# Patient Record
Sex: MALE | Race: WHITE | Employment: FULL TIME | URBAN - METROPOLITAN AREA
[De-identification: names, ages, dates, MRNs, and addresses within clinical notes are randomized per-mention and may not be internally consistent; named-entity substitution may affect disease eponyms.]

---

## 2017-07-08 ENCOUNTER — MYC REFILL (OUTPATIENT)
Dept: FAMILY MEDICINE | Facility: CLINIC | Age: 42
End: 2017-07-08

## 2017-07-08 DIAGNOSIS — N52.9 ERECTILE DYSFUNCTION, UNSPECIFIED ERECTILE DYSFUNCTION TYPE: ICD-10-CM

## 2017-07-08 RX ORDER — SILDENAFIL 100 MG/1
100 TABLET, FILM COATED ORAL DAILY PRN
Qty: 20 TABLET | Refills: 11 | Status: CANCELLED | OUTPATIENT
Start: 2017-07-08

## 2017-07-10 ENCOUNTER — TELEPHONE (OUTPATIENT)
Dept: FAMILY MEDICINE | Facility: CLINIC | Age: 42
End: 2017-07-10

## 2017-07-10 DIAGNOSIS — N52.9 ERECTILE DYSFUNCTION, UNSPECIFIED ERECTILE DYSFUNCTION TYPE: ICD-10-CM

## 2017-07-10 RX ORDER — SILDENAFIL 100 MG/1
TABLET, FILM COATED ORAL
Qty: 20 TABLET | Refills: 11 | Status: SHIPPED | OUTPATIENT
Start: 2017-07-10 | End: 2017-07-11

## 2017-07-10 RX ORDER — SILDENAFIL CITRATE 20 MG/1
TABLET ORAL
Qty: 90 TABLET | Refills: 0 | Status: SHIPPED | OUTPATIENT
Start: 2017-07-10 | End: 2017-07-12

## 2017-07-10 NOTE — TELEPHONE ENCOUNTER
Message from [employer]:  Original authorizing provider: Krupa Alcantara MD    Alberto Fuentes would like a refill of the following medications:  sildenafil (VIAGRA) 100 MG cap/tab [Krupa Alcantara MD]    Preferred pharmacy: [facility] (HOME DELIVERY - [address]    Comment:

## 2017-07-10 NOTE — TELEPHONE ENCOUNTER
Spoke with patient on phone. Previous rx for viagra sent on 1/2/17 was sent to mail order pharmacy. Patient reports he has never used that mail order pharmacy and that he uses [facility] pharmacy. He would like refills resent to [facility] pharmacy.   Done. Brook Arora RN

## 2017-07-10 NOTE — TELEPHONE ENCOUNTER
We received a prescription for Viagra 100mg. Pt is requesting rx for Sildenafil 20mg.  This is generic and the price is way more affordable.  20 tablets of the 100mg is over $1200.    Thank you,  Neeru Jarquin, [facility]  [phone]

## 2017-07-11 ENCOUNTER — TELEPHONE (OUTPATIENT)
Dept: FAMILY MEDICINE | Facility: CLINIC | Age: 42
End: 2017-07-11

## 2017-07-11 ENCOUNTER — MYC MEDICAL ADVICE (OUTPATIENT)
Dept: FAMILY MEDICINE | Facility: CLINIC | Age: 42
End: 2017-07-11

## 2017-07-11 DIAGNOSIS — N52.9 ERECTILE DYSFUNCTION, UNSPECIFIED ERECTILE DYSFUNCTION TYPE: ICD-10-CM

## 2017-07-11 NOTE — TELEPHONE ENCOUNTER
Called pharmacy, patient prefers the 20 mg tabs as they are cheaper.  Called in the 20 mg dosing prescription to Sabina the pharmacist.   DIPTI ShethN RN

## 2017-07-11 NOTE — TELEPHONE ENCOUNTER
Both prescriptions have been sent. What else is needed?  It is unlikely to be covered by insurance. Cheapest is generally the 20 mg tablets and they take anywhere from 2-5 at a time.     Krupa Alcantara

## 2017-07-11 NOTE — TELEPHONE ENCOUNTER
Pharmacy calling, needing clarification for viagra. Both 20 mg and 100 mg capsules are in med list.    Patient specifically requesting 100 mg as he pays out of pocket.    Sending to PCP to clarify what patient is to be on. Then RN can call in clarification to pharmacy.    Marisol Bernal, DIPTIN RN

## 2017-07-12 RX ORDER — SILDENAFIL CITRATE 20 MG/1
TABLET ORAL
Qty: 90 TABLET | Refills: 3 | Status: SHIPPED | OUTPATIENT
Start: 2017-07-12 | End: 2018-04-11

## 2017-07-12 NOTE — TELEPHONE ENCOUNTER
RN cannot authorize refills as patient is due to be seen in September 2017.    Will Dr. Krupa Sands authorize refills?    Thank you, Marisol Bernal, DIPTIN RN

## 2017-10-13 DIAGNOSIS — N52.9 ERECTILE DYSFUNCTION, UNSPECIFIED ERECTILE DYSFUNCTION TYPE: ICD-10-CM

## 2017-10-13 NOTE — TELEPHONE ENCOUNTER
Drug:Sildenafil 20mg  Last Fill Date: 7/11/17  Last Fill Quantity: 90  Last Office Visit: 9/9/16    Neeru Jarquin [facility]  [phone]

## 2017-10-13 NOTE — TELEPHONE ENCOUNTER
Patient overdue to be seen.   Been greater than 1 year since last visit with PCP .    No appointment pending at this time.  Routing to provider to advise.    Minerva Joel RN

## 2017-10-17 RX ORDER — SILDENAFIL CITRATE 20 MG/1
TABLET ORAL
Qty: 90 TABLET | Refills: 0 | OUTPATIENT
Start: 2017-10-17

## 2017-10-17 NOTE — TELEPHONE ENCOUNTER
Refill for sildenafil declined due to need for appointment.  please notify patient or help schedule. Brook Arora RN

## 2017-10-18 DIAGNOSIS — N52.9 ERECTILE DYSFUNCTION, UNSPECIFIED ERECTILE DYSFUNCTION TYPE: ICD-10-CM

## 2017-10-18 RX ORDER — SILDENAFIL CITRATE 20 MG/1
TABLET ORAL
OUTPATIENT
Start: 2017-10-18

## 2017-10-18 NOTE — TELEPHONE ENCOUNTER
Krupa Sands MD     9:27 AM   Note      Pt needs to be seen once yearly for refills.     Krupa Sands        Refill of revatio refused.    Minerva Joel RN

## 2017-10-18 NOTE — TELEPHONE ENCOUNTER
I spoke to the patient and offered to make an appointment for him.  He prefers to call back and schedule an appointment.  Akila Dunlap,

## 2018-04-02 NOTE — PROGRESS NOTES
SUBJECTIVE:   CC: Alberto Fuentes is an 42 year old male who presents for preventative health visit.     Physical   Annual:     Getting at least 3 servings of Calcium per day::  Yes    Bi-annual eye exam::  Yes    Dental care twice a year::  Yes    Sleep apnea or symptoms of sleep apnea::  None    Diet::  Regular (no restrictions)    Frequency of exercise::  4-5 days/week    Duration of exercise::  30-45 minutes    Taking medications regularly::  Yes    Medication side effects::  Not applicable    Additional concerns today::  No            The 10-year ASCVD risk score (Skull Valleymarlene BRYANT Jr, et al., 2013) is: 5.5%    Values used to calculate the score:      Age: 42 years      Sex: Male      Is Non- : No      Diabetic: No      Tobacco smoker: Yes      Systolic Blood Pressure: 121 mmHg      Is BP treated: No      HDL Cholesterol: 44 mg/dL      Total Cholesterol: 202 mg/dL    No concerns, feels great  Eating healthy and exercising regularly      Today's PHQ-2 Score:   PHQ-2 ( 1999 Pf[employer]) 4/11/2018   Q1: Little interest or pleasure in doing things 0   Q2: Feeling down, depressed or hopeless 0   PHQ-2 Score 0   Q1: Little interest or pleasure in doing things Not at all   Q2: Feeling down, depressed or hopeless Not at all   PHQ-2 Score 0       Abuse: Current or Past(Physical, Sexual or Emotional)- No  Do you feel safe in your environment - Yes    Social History   Substance Use Topics     Smoking status: Current Every Day Smoker     Packs/day: 0.75     Years: 8.00     Types: Cigarettes     Smokeless tobacco: Never Used      Comment: quit 2010     Alcohol use No     Alcohol Use 4/11/2018   If you drink alcohol do you typically have greater than 3 drinks per day OR greater than 7 drinks per week? No   No flowsheet data found.    Last PSA: No results found for: PSA    Reviewed orders with patient. Reviewed health maintenance and updated orders accordingly - Yes  Labs reviewed in EPIC    Reviewed and updated as needed  "this visit by clinical staff  Tobacco  Allergies  Meds  Med Hx  Surg Hx  Fam Hx  Soc Hx        Reviewed and updated as needed this visit by Provider            Review of Systems  C: NEGATIVE for fever, chills, change in weight  I: NEGATIVE for worrisome rashes, moles or lesions  E: NEGATIVE for vision changes or irritation  ENT: NEGATIVE for ear, mouth and throat problems  R: NEGATIVE for significant cough or SOB  CV: NEGATIVE for chest pain, palpitations or peripheral edema  GI: NEGATIVE for nausea, abdominal pain, heartburn, or change in bowel habits   male: negative for dysuria, hematuria, decreased urinary stream, erectile dysfunction, urethral discharge  M: NEGATIVE for significant arthralgias or myalgia  N: NEGATIVE for weakness, dizziness or paresthesias  P: NEGATIVE for changes in mood or affect    OBJECTIVE:   /76  Pulse 76  Temp 97.7  F (36.5  C) (Oral)  Resp 17  Ht 5' 8\" (1.727 m)  Wt 224 lb (101.6 kg)  SpO2 97%  BMI 34.06 kg/m2    Physical Exam  GENERAL: healthy, alert and no distress  EYES: Eyes grossly normal to inspection, PERRL and conjunctivae and sclerae normal  HENT: ear canals and TM's normal, nose and mouth without ulcers or lesions  NECK: no adenopathy, no asymmetry, masses, or scars and thyroid normal to palpation  RESP: lungs clear to auscultation - no rales, rhonchi or wheezes  CV: regular rate and rhythm, normal S1 S2, no S3 or S4, no murmur, click or rub, no peripheral edema and peripheral pulses strong  ABDOMEN: soft, nontender, no hepatosplenomegaly, no masses and bowel sounds normal  MS: no gross musculoskeletal defects noted, no edema  SKIN: no suspicious lesions or rashes  NEURO: Normal strength and tone, mentation intact and speech normal  PSYCH: mentation appears normal, affect normal/bright    ASSESSMENT/PLAN:   1. Routine general medical examination at a health care facility      2. Erectile dysfunction, unspecified erectile dysfunction type  Refill as " "needed  - sildenafil (REVATIO) 20 MG tablet; Take 1 tablet (20 mg)to 5 tablets by mouth as needed  30 minutes to4 hours before intercourse Never use with nitroglycerin, terazosin or doxazosin.  Dispense: 90 tablet; Refill: 3    COUNSELING:   Reviewed preventive health counseling, as reflected in patient instructions       Regular exercise       Healthy diet/nutrition       Vision screening         reports that he has been smoking Cigarettes.  He has a 6.00 pack-year smoking history. He has never used smokeless tobacco.    Estimated body mass index is 34.06 kg/(m^2) as calculated from the following:    Height as of this encounter: 5' 8\" (1.727 m).    Weight as of this encounter: 224 lb (101.6 kg).   Weight management plan: Discussed healthy diet and exercise guidelines and patient will follow up in 12 months in clinic to re-evaluate.    Counseling Resources:  ATP IV Guidelines  Pooled Cohorts Equation Calculator  FRAX Risk Assessment  ICSI Preventive Guidelines  Dietary Guidelines for Americans, 2010  [employer] MyPlate  ASA Prophylaxis  Lung CA Screening    Krupa Alcantara MD  [facility]  Answers for HPI/ROS submitted by the patient on 4/11/2018   PHQ-2 Score: 0    "

## 2018-04-04 ENCOUNTER — DOCUMENTATION ONLY (OUTPATIENT)
Dept: LAB | Facility: CLINIC | Age: 43
End: 2018-04-04

## 2018-04-04 DIAGNOSIS — Z13.1 SCREENING FOR DIABETES MELLITUS: ICD-10-CM

## 2018-04-04 DIAGNOSIS — Z13.6 CARDIOVASCULAR SCREENING; LDL GOAL LESS THAN 160: Primary | ICD-10-CM

## 2018-04-04 NOTE — PROGRESS NOTES
This patient has a lab only appointment on 4/7/2018 but does not have future orders. Please review, associate diagnosis and sign pending lab orders for the upcoming appointment.  He has an appointment with Dr. Sands on 4/11/2018.    Thank you,    [facility]

## 2018-04-05 DIAGNOSIS — Z13.6 CARDIOVASCULAR SCREENING; LDL GOAL LESS THAN 160: ICD-10-CM

## 2018-04-05 DIAGNOSIS — Z13.1 SCREENING FOR DIABETES MELLITUS: ICD-10-CM

## 2018-04-05 LAB
CHOLEST SERPL-MCNC: 202 MG/DL
GLUCOSE SERPL-MCNC: 104 MG/DL (ref 70–99)
HDLC SERPL-MCNC: 44 MG/DL
LDLC SERPL CALC-MCNC: 133 MG/DL
NONHDLC SERPL-MCNC: 158 MG/DL
TRIGL SERPL-MCNC: 124 MG/DL

## 2018-04-05 PROCEDURE — 80061 LIPID PANEL: CPT | Performed by: FAMILY MEDICINE

## 2018-04-05 PROCEDURE — 36415 COLL VENOUS BLD VENIPUNCTURE: CPT | Performed by: FAMILY MEDICINE

## 2018-04-05 PROCEDURE — 82947 ASSAY GLUCOSE BLOOD QUANT: CPT | Performed by: FAMILY MEDICINE

## 2018-04-11 ENCOUNTER — OFFICE VISIT (OUTPATIENT)
Dept: FAMILY MEDICINE | Facility: CLINIC | Age: 43
End: 2018-04-11
Payer: COMMERCIAL

## 2018-04-11 VITALS
WEIGHT: 224 LBS | HEIGHT: 68 IN | BODY MASS INDEX: 33.95 KG/M2 | OXYGEN SATURATION: 97 % | HEART RATE: 76 BPM | RESPIRATION RATE: 17 BRPM | TEMPERATURE: 97.7 F | SYSTOLIC BLOOD PRESSURE: 121 MMHG | DIASTOLIC BLOOD PRESSURE: 76 MMHG

## 2018-04-11 DIAGNOSIS — N52.9 ERECTILE DYSFUNCTION, UNSPECIFIED ERECTILE DYSFUNCTION TYPE: ICD-10-CM

## 2018-04-11 DIAGNOSIS — Z00.00 ROUTINE GENERAL MEDICAL EXAMINATION AT A HEALTH CARE FACILITY: Primary | ICD-10-CM

## 2018-04-11 PROCEDURE — 99396 PREV VISIT EST AGE 40-64: CPT | Performed by: FAMILY MEDICINE

## 2018-04-11 RX ORDER — SILDENAFIL CITRATE 20 MG/1
TABLET ORAL
Qty: 90 TABLET | Refills: 3 | Status: SHIPPED | OUTPATIENT
Start: 2018-04-11 | End: 2018-04-11

## 2018-04-11 RX ORDER — SILDENAFIL CITRATE 20 MG/1
TABLET ORAL
Qty: 90 TABLET | Refills: 3 | Status: SHIPPED | OUTPATIENT
Start: 2018-04-11 | End: 2019-06-09

## 2018-04-11 NOTE — MR AVS SNAPSHOT
After Visit Summary   4/11/2018    Alberto Fuentes    MRN: [id number]           Patient Information     Date Of Birth          1975        Visit Information        Provider Department      4/11/2018 5:40 PM Krupa Sands MD [facility]        Today's Diagnoses     Routine general medical examination at a health care facility    -  1    Erectile dysfunction, unspecified erectile dysfunction type          Care Instructions      Preventive Health Recommendations  Male Ages 40 to 49    Yearly exam:             See your health care provider every year in order to  o   Review health changes.   o   Discuss preventive care.    o   Review your medicines if your doctor has prescribed any.    You should be tested each year for STDs (sexually transmitted diseases) if you re at risk.     Have a cholesterol test every 5 years.     Have a colonoscopy (test for colon cancer) if someone in your family has had colon cancer or polyps before age 50.     After age 45, have a diabetes test (fasting glucose). If you are at risk for diabetes, you should have this test every 3 years.      Talk with your health care provider about whether or not a prostate cancer screening test (PSA) is right for you.    Shots: Get a flu shot each year. Get a tetanus shot every 10 years.     Nutrition:    Eat at least 5 servings of fruits and vegetables daily.     Eat whole-grain bread, whole-wheat pasta and brown rice instead of white grains and rice.     Talk to your provider about Calcium and Vitamin D.     Lifestyle    Exercise for at least 150 minutes a week (30 minutes a day, 5 days a week). This will help you control your weight and prevent disease.     Limit alcohol to one drink per day.     No smoking.     Wear sunscreen to prevent skin cancer.     See your dentist every six months for an exam and cleaning.              Follow-ups after your visit        Who to contact     If you have questions or need follow up  "information about today's clinic visit or your schedule please contact [facility] directly at [phone].  Normal or non-critical lab and imaging results will be communicated to you by MyChart, letter or phone within 4 business days after the clinic has received the results. If you do not hear from us within 7 days, please contact the clinic through Samb[employer]on[employer] or phone. If you have a critical or abnormal lab result, we will notify you by phone as soon as possible.  Submit refill requests through [employer] or call your pharmacy and they will forward the refill request to us. Please allow 3 business days for your refill to be completed.          Additional Information About Your Visit        Samb[employer]on[employer] Information     [employer] gives you secure access to your electronic health record. If you see a primary care provider, you can also send messages to your care team and make appointments. If you have questions, please call your primary care clinic.  If you do not have a primary care provider, please call [phone] and they will assist you.        Care EveryWhere ID     This is your Care EveryWhere ID. This could be used by other organizations to access your [place] medical records  [id number]        Your Vitals Were     Pulse Temperature Respirations Height Pulse Oximetry BMI (Body Mass Index)    76 97.7  F (36.5  C) (Oral) 17 5' 8\" (1.727 m) 97% 34.06 kg/m2       Blood Pressure from Last 3 Encounters:   04/11/18 121/76   09/09/16 102/67   08/23/16 120/70    Weight from Last 3 Encounters:   04/11/18 224 lb (101.6 kg)   09/09/16 228 lb (103.4 kg)   08/23/16 236 lb (107 kg)              Today, you had the following     No orders found for display         Today's Medication Changes          These changes are accurate as of 4/11/18 11:59 PM.  If you have any questions, ask your nurse or doctor.               Start taking these medicines.        Dose/Directions    sildenafil 20 MG tablet   Commonly known as:  " REVATIO   Used for:  Erectile dysfunction, unspecified erectile dysfunction type   Started by:  Krupa Sands MD        Take 1 tablet (20 mg)to 5 tablets by mouth as needed  30 minutes to4 hours before intercourse Never use with nitroglycerin, terazosin or doxazosin.   Quantity:  90 tablet   Refills:  3            Where to get your medicines      Some of these will need a paper prescription and others can be bought over the counter.  Ask your nurse if you have questions.     Bring a paper prescription for each of these medications     sildenafil 20 MG tablet                Primary Care Provider Office Phone # Fax #    Krupa Sands [phone] [address] [facility] [address]        Equal Access to Services     MOISES JAMES : Hadii luis bright hadasho Solibby, waaxda luqadaha, qaybta kaalmada adeegyada, fatmata díaz. So [facility] [phone].    ATENCIÓN: Si habla español, tiene a chavez disposición servicios gratuitos de asistencia lingüística. Llame al [phone].    We comply with applicable federal civil rights laws and [place] laws. We do not discriminate on the basis of race, color, national origin, age, disability, sex, sexual orientation, or gender identity.            Thank you!     Thank you for choosing [facility]  for your care. Our goal is always to provide you with excellent care. Hearing back from our patients is one way we can continue to improve our services. Please take a few minutes to complete the written survey that you may receive in the mail after your visit with us. Thank you!             Your Updated Medication List - Protect others around you: Learn how to safely use, store and throw away your medicines at www.disposemymeds.org.          This list is accurate as of 4/11/18 11:59 PM.  Always use your most recent med list.                   Brand Name Dispense Instructions for use Diagnosis    ADVIL PO           sildenafil 20  MG tablet    REVATIO    90 tablet    Take 1 tablet (20 mg)to 5 tablets by mouth as needed  30 minutes to4 hours before intercourse Never use with nitroglycerin, terazosin or doxazosin.    Erectile dysfunction, unspecified erectile dysfunction type

## 2019-01-31 NOTE — TELEPHONE ENCOUNTER
Assessment/Plan:     Diagnoses and all orders for this visit:    Type 2 diabetes mellitus without complication, without long-term current use of insulin (HCC)  -     Hemoglobin A1C; Future  -     Microalbumin / creatinine urine ratio  -     Hemoglobin A1C    Neuropathic pain  -     gabapentin (NEURONTIN) 400 mg capsule; Take 1 capsule (400 mg total) by mouth 3 (three) times a day    HTN (hypertension), benign    Screening for prostate cancer  -     PSA Total (Reflex To Free); Future  -     PSA Total (Reflex To Free)    Hyperlipidemia, unspecified hyperlipidemia type  -     Comprehensive metabolic panel; Future  -     Lipid panel; Future  -     Comprehensive metabolic panel  -     Lipid panel    Screening for colon cancer  -     Occult Blood, Fecal Immunochemical; Future      patient comes back as new patient  We ordered lab work on him  He follows pain management will continue to do so  He will continue his current medications  I ordered a stool screening test for colon cancer as he has not had a colonoscopy and is unwilling to do so  He can follow up in 4 months or sooner if need      Subjective:     Chief Complaint   Patient presents with    [facility] Care     new patient        Patient ID: Fraser Gottron is a 76 y o  male  Patient is here for new patient visit  He has no acute complaints  He had recent blood work done last September that showed sugars were well controlled  He has a history of neuropathy  No other acute complaints today        The following portions of the patient's history were reviewed and updated as appropriate: allergies, current medications, past family history, past medical history, past social history, past surgical history and problem list     Review of Systems   Constitutional: Negative  HENT: Negative  Eyes: Negative  Respiratory: Negative  Cardiovascular: Negative  Gastrointestinal: Negative  Endocrine: Negative  Genitourinary: Negative  Patient calling, asked to speak with Marisol, declined to leave a reason other than it is related to his [employer] message.   Musculoskeletal: Negative  Skin: Negative  Allergic/Immunologic: Negative  Neurological: Negative  Hematological: Negative  Psychiatric/Behavioral: Negative  All other systems reviewed and are negative  Objective:    Vitals:    01/31/19 0946   BP: 110/70   BP Location: Right arm   Patient Position: Sitting   Cuff Size: Large   Pulse: 71   Resp: 15   Temp: 98 2 °F (36 8 °C)   TempSrc: Tympanic   SpO2: 96%   Weight: 124 kg (274 lb)   Height: 5' 7" (1 702 m)          Physical Exam   Constitutional: He is oriented to person, place, and time  He appears well-developed and well-nourished  No distress  HENT:   Head: Normocephalic  Right Ear: External ear normal    Left Ear: External ear normal    Nose: Nose normal    Mouth/Throat: Oropharynx is clear and moist    Eyes: Pupils are equal, round, and reactive to light  Conjunctivae and EOM are normal  Right eye exhibits no discharge  Left eye exhibits no discharge  Neck: Normal range of motion  Cardiovascular: Normal rate, regular rhythm and normal heart sounds  Pulmonary/Chest: Effort normal and breath sounds normal    Abdominal: Soft  Bowel sounds are normal  He exhibits no distension  There is no tenderness  Musculoskeletal: Normal range of motion  Neurological: He is alert and oriented to person, place, and time  No cranial nerve deficit  Skin: Skin is warm and dry  No rash noted  Psychiatric: He has a normal mood and affect  His behavior is normal  Judgment and thought content normal    Nursing note and vitals reviewed  Patient's shoes and socks were not removed     patient declined foot exam today and refused to take off socks and shoes

## 2019-04-22 ENCOUNTER — TRANSFERRED RECORDS (OUTPATIENT)
Dept: HEALTH INFORMATION MANAGEMENT | Facility: CLINIC | Age: 44
End: 2019-04-22

## 2019-06-09 DIAGNOSIS — N52.9 ERECTILE DYSFUNCTION, UNSPECIFIED ERECTILE DYSFUNCTION TYPE: ICD-10-CM

## 2019-06-09 NOTE — LETTER
June 13, 2019    Alberto Fuentes  [address] [place] [address]    Dear Alberto,       We recently received a refill request for Sildenafil.  We have refilled this for a one time 30 day supply only because you are due for a:    ED office visit      Please call at your earliest convenience so that there will not be a delay with your future refills.          Thank you,   Your [facility] Team/jillian  [phone]

## 2019-06-10 RX ORDER — SILDENAFIL CITRATE 20 MG/1
TABLET ORAL
Qty: 30 TABLET | Refills: 0 | Status: SHIPPED | OUTPATIENT
Start: 2019-06-10 | End: 2020-03-23

## 2019-06-10 NOTE — TELEPHONE ENCOUNTER
Medication is being filled for 1 time refill only due to:  Patient needs to be seen because it has been more than one year since last visit. ED.  Keyla RESENDEZN, RN

## 2020-03-01 ENCOUNTER — HEALTH MAINTENANCE LETTER (OUTPATIENT)
Age: 45
End: 2020-03-01

## 2020-03-23 ENCOUNTER — MYC REFILL (OUTPATIENT)
Dept: FAMILY MEDICINE | Facility: CLINIC | Age: 45
End: 2020-03-23

## 2020-03-23 ENCOUNTER — MYC MEDICAL ADVICE (OUTPATIENT)
Dept: FAMILY MEDICINE | Facility: CLINIC | Age: 45
End: 2020-03-23

## 2020-03-23 DIAGNOSIS — N52.9 ERECTILE DYSFUNCTION, UNSPECIFIED ERECTILE DYSFUNCTION TYPE: ICD-10-CM

## 2020-03-23 NOTE — TELEPHONE ENCOUNTER
Patient is due for an office visit.  Please advise, would you like patient to do a Virtual Visit for follow up?  If so, which one?   Angelita Morris RN

## 2020-03-24 ENCOUNTER — E-VISIT (OUTPATIENT)
Dept: FAMILY MEDICINE | Facility: CLINIC | Age: 45
End: 2020-03-24
Payer: COMMERCIAL

## 2020-03-24 DIAGNOSIS — N52.9 ERECTILE DYSFUNCTION, UNSPECIFIED ERECTILE DYSFUNCTION TYPE: Primary | ICD-10-CM

## 2020-03-24 PROCEDURE — 99207 ZZC NON-BILLABLE SERV PER CHARTING: CPT | Performed by: FAMILY MEDICINE

## 2020-03-24 RX ORDER — SILDENAFIL CITRATE 20 MG/1
TABLET ORAL
Qty: 30 TABLET | Refills: 2 | Status: SHIPPED | OUTPATIENT
Start: 2020-03-24 | End: 2020-07-27

## 2020-03-24 NOTE — TELEPHONE ENCOUNTER
Patient has an up coming lab appointment on 3.28.2020. Please review and place future orders that may be needed.     Thank you  Andria Dunne MLT ([facility])

## 2020-03-27 ENCOUNTER — DOCUMENTATION ONLY (OUTPATIENT)
Dept: LAB | Facility: CLINIC | Age: 45
End: 2020-03-27

## 2020-03-27 NOTE — PROGRESS NOTES
I spoke to the patient and I cancelled his pvl appt because his physical was cancelled.  No need for pvl's.  Akila Dunlap,

## 2020-07-25 DIAGNOSIS — N52.9 ERECTILE DYSFUNCTION, UNSPECIFIED ERECTILE DYSFUNCTION TYPE: ICD-10-CM

## 2020-07-27 RX ORDER — SILDENAFIL CITRATE 20 MG/1
TABLET ORAL
Qty: 30 TABLET | Refills: 2 | Status: SHIPPED | OUTPATIENT
Start: 2020-07-27 | End: 2020-11-27

## 2020-07-27 NOTE — TELEPHONE ENCOUNTER
Prescription approved per [facility] Refill Protocol.  Angelita Morris RN

## 2020-07-28 ENCOUNTER — MYC REFILL (OUTPATIENT)
Dept: FAMILY MEDICINE | Facility: CLINIC | Age: 45
End: 2020-07-28

## 2020-07-28 DIAGNOSIS — N52.9 ERECTILE DYSFUNCTION, UNSPECIFIED ERECTILE DYSFUNCTION TYPE: ICD-10-CM

## 2020-07-28 RX ORDER — SILDENAFIL CITRATE 20 MG/1
TABLET ORAL
Qty: 30 TABLET | Refills: 2 | Status: CANCELLED | OUTPATIENT
Start: 2020-07-28

## 2020-11-27 ENCOUNTER — MYC REFILL (OUTPATIENT)
Dept: FAMILY MEDICINE | Facility: CLINIC | Age: 45
End: 2020-11-27

## 2020-11-27 DIAGNOSIS — N52.9 ERECTILE DYSFUNCTION, UNSPECIFIED ERECTILE DYSFUNCTION TYPE: ICD-10-CM

## 2020-11-30 RX ORDER — SILDENAFIL CITRATE 20 MG/1
TABLET ORAL
Qty: 30 TABLET | Refills: 2 | Status: SHIPPED | OUTPATIENT
Start: 2020-11-30 | End: 2021-11-26

## 2020-12-13 ENCOUNTER — HEALTH MAINTENANCE LETTER (OUTPATIENT)
Age: 45
End: 2020-12-13

## 2021-04-17 ENCOUNTER — HEALTH MAINTENANCE LETTER (OUTPATIENT)
Age: 46
End: 2021-04-17

## 2021-09-26 ENCOUNTER — HEALTH MAINTENANCE LETTER (OUTPATIENT)
Age: 46
End: 2021-09-26

## 2021-11-01 NOTE — PROGRESS NOTES
SUBJECTIVE:   CC: Alberto Fuentes is an 46 year old male who presents for preventative health visit.       Patient has been advised of split billing requirements and indicates understanding: Yes  Healthy Habits:     Getting at least 3 servings of Calcium per day:  Yes    Bi-annual eye exam:  Yes    Dental care twice a year:  Yes    Sleep apnea or symptoms of sleep apnea:  None    Diet:  Regular (no restrictions)    Frequency of exercise:  2-3 days/week    Duration of exercise:  30-45 minutes    Taking medications regularly:  Yes    Medication side effects:  None    PHQ-2 Total Score: 0    Additional concerns today:  No      Left shoulder pain for 18 months  Does not recall any injury to bring this on recently but did injure it in his 20s when he was weight lifting  Has seen physical therapist from work   Has done exercise off and on for this past year and a half  It flares up and down but has not gone away        Today's PHQ-2 Score:   PHQ-2 ( 1999 [employer]) 4/11/2018   Q1: Little interest or pleasure in doing things 0   Q2: Feeling down, depressed or hopeless 0   PHQ-2 Score 0   Q1: Little interest or pleasure in doing things Not at all   Q2: Feeling down, depressed or hopeless Not at all   PHQ-2 Score 0       Abuse: Current or Past(Physical, Sexual or Emotional)- No  Do you feel safe in your environment? Yes    Have you ever done Advance Care Planning? (For example, a Health Directive, POLST, or a discussion with a medical provider or your loved ones about your wishes): Yes, patient states has an Advance Care Planning document and will bring a copy to the clinic.    Social History     Tobacco Use     Smoking status: Current Every Day Smoker     Packs/day: 0.75     Years: 8.00     Pack years: 6.00     Types: Cigarettes     Smokeless tobacco: Never Used     Tobacco comment: quit 2010   Substance Use Topics     Alcohol use: No     If you drink alcohol do you typically have >3 drinks per day or >7 drinks per week?  No    Alcohol Use 4/11/2018   Prescreen: >3 drinks/day or >7 drinks/week? No   Prescreen: >3 drinks/day or >7 drinks/week? -       Last PSA: No results found for: PSA    Reviewed orders with patient. Reviewed health maintenance and updated orders accordingly - Yes  Lab work is in process    Reviewed and updated as needed this visit by clinical staff                 Reviewed and updated as needed this visit by Provider                    Review of Systems   Constitutional: Negative for chills and fever.   HENT: Negative for congestion, ear pain, hearing loss and sore throat.    Eyes: Negative for pain and visual disturbance.   Respiratory: Negative for cough and shortness of breath.    Cardiovascular: Negative for chest pain, palpitations and peripheral edema.   Gastrointestinal: Negative for abdominal pain, constipation, diarrhea, heartburn, hematochezia and nausea.   Genitourinary: Positive for impotence. Negative for discharge, dysuria, frequency, genital sores, hematuria and urgency.   Musculoskeletal: Positive for arthralgias. Negative for myalgias.   Skin: Negative for rash.   Neurological: Negative for dizziness, weakness, headaches and paresthesias.   Psychiatric/Behavioral: Negative for mood changes. The patient is not nervous/anxious.      CONSTITUTIONAL: NEGATIVE for fever, chills, change in weight  INTEGUMENTARY/SKIN: NEGATIVE for worrisome rashes, moles or lesions  EYES: NEGATIVE for vision changes or irritation  ENT: NEGATIVE for ear, mouth and throat problems  RESP: NEGATIVE for significant cough or SOB  CV: NEGATIVE for chest pain, palpitations or peripheral edema  GI: NEGATIVE for nausea, abdominal pain, heartburn, or change in bowel habits   male: negative for dysuria, hematuria, decreased urinary stream, erectile dysfunction, urethral discharge  MUSCULOSKELETAL: NEGATIVE for significant arthralgias or myalgia  NEURO: NEGATIVE for weakness, dizziness or paresthesias  PSYCHIATRIC: NEGATIVE for  changes in mood or affect    OBJECTIVE:   There were no vitals taken for this visit.    Physical Exam  GENERAL: healthy, alert and no distress  EYES: Eyes grossly normal to inspection, PERRL and conjunctivae and sclerae normal  HENT: ear canals and TM's normal, nose and mouth without ulcers or lesions  NECK: no adenopathy, no asymmetry, masses, or scars and thyroid normal to palpation  RESP: lungs clear to auscultation - no rales, rhonchi or wheezes  CV: regular rate and rhythm, normal S1 S2, no S3 or S4, no murmur, click or rub, no peripheral edema and peripheral pulses strong  ABDOMEN: soft, nontender, no hepatosplenomegaly, no masses and bowel sounds normal  MS: no gross musculoskeletal defects noted, no edema, + empty pop can and lift off test for left shoulder  SKIN: no suspicious lesions or rashes  NEURO: Normal strength and tone, mentation intact and speech normal  PSYCH: mentation appears normal, affect normal/bright    Diagnostic Test Results:  Labs reviewed in Epic    ASSESSMENT/PLAN:   (Z00.00) Routine general medical examination at a health care facility  (primary encounter diagnosis)  Comment:   Plan:     (M25.512,  G89.29) Chronic left shoulder pain  Comment: needs MRI since failed therapy  Plan: MR Shoulder Left w/o Contrast            (E66.01) Morbid obesity (H)  Comment: encourage regular exercise and healthy diet  Plan:     (Z23) Need for diphtheria-tetanus-pertussis (Tdap) vaccine  Comment: given  Plan: TDAP VACCINE (Adacel, Boostrix)  [[id number]]            (Z13.1) Screening for diabetes mellitus  Comment:   Plan: **Basic metabolic panel FUTURE 2mo            (Z13.220) Screening cholesterol level  Comment:   Plan: Lipid panel reflex to direct LDL Fasting              Patient has been advised of split billing requirements and indicates understanding: Yes  COUNSELING:   Reviewed preventive health counseling, as reflected in patient instructions       Regular exercise       Healthy diet/nutrition       " Vision screening    Estimated body mass index is 34.06 kg/m  as calculated from the following:    Height as of 4/11/18: 1.727 m (5' 8\").    Weight as of 4/11/18: 101.6 kg (224 lb).     Weight management plan: Discussed healthy diet and exercise guidelines    He reports that he has been smoking cigarettes. He has a 6.00 pack-year smoking history. He has never used smokeless tobacco.  Tobacco Cessation Action Plan:   Information offered: Patient not interested at this time      Counseling Resources:  ATP IV Guidelines  Pooled Cohorts Equation Calculator  FRAX Risk Assessment  ICSI Preventive Guidelines  Dietary Guidelines for Americans, 2010  USDA's MyPlate  ASA Prophylaxis  Lung CA Screening    Krupa Zayas MD  [facility]    "

## 2021-11-02 ENCOUNTER — OFFICE VISIT (OUTPATIENT)
Dept: FAMILY MEDICINE | Facility: CLINIC | Age: 46
End: 2021-11-02
Payer: COMMERCIAL

## 2021-11-02 VITALS
SYSTOLIC BLOOD PRESSURE: 128 MMHG | WEIGHT: 256 LBS | RESPIRATION RATE: 16 BRPM | DIASTOLIC BLOOD PRESSURE: 80 MMHG | TEMPERATURE: 98.4 F | BODY MASS INDEX: 38.8 KG/M2 | OXYGEN SATURATION: 98 % | HEIGHT: 68 IN | HEART RATE: 78 BPM

## 2021-11-02 DIAGNOSIS — Z13.1 SCREENING FOR DIABETES MELLITUS: ICD-10-CM

## 2021-11-02 DIAGNOSIS — Z13.220 SCREENING CHOLESTEROL LEVEL: ICD-10-CM

## 2021-11-02 DIAGNOSIS — Z23 NEED FOR DIPHTHERIA-TETANUS-PERTUSSIS (TDAP) VACCINE: ICD-10-CM

## 2021-11-02 DIAGNOSIS — E66.01 MORBID OBESITY (H): ICD-10-CM

## 2021-11-02 DIAGNOSIS — Z00.00 ROUTINE GENERAL MEDICAL EXAMINATION AT A HEALTH CARE FACILITY: Primary | ICD-10-CM

## 2021-11-02 DIAGNOSIS — M25.512 CHRONIC LEFT SHOULDER PAIN: ICD-10-CM

## 2021-11-02 DIAGNOSIS — G89.29 CHRONIC LEFT SHOULDER PAIN: ICD-10-CM

## 2021-11-02 LAB
ANION GAP SERPL CALCULATED.3IONS-SCNC: 3 MMOL/L (ref 3–14)
BUN SERPL-MCNC: 14 MG/DL (ref 7–30)
CALCIUM SERPL-MCNC: 9 MG/DL (ref 8.5–10.1)
CHLORIDE BLD-SCNC: 104 MMOL/L (ref 94–109)
CHOLEST SERPL-MCNC: 253 MG/DL
CO2 SERPL-SCNC: 29 MMOL/L (ref 20–32)
CREAT SERPL-MCNC: 0.95 MG/DL (ref 0.66–1.25)
FASTING STATUS PATIENT QL REPORTED: YES
GFR SERPL CREATININE-BSD FRML MDRD: >90 ML/MIN/1.73M2
GLUCOSE BLD-MCNC: 111 MG/DL (ref 70–99)
HDLC SERPL-MCNC: 37 MG/DL
LDLC SERPL CALC-MCNC: 174 MG/DL
NONHDLC SERPL-MCNC: 216 MG/DL
POTASSIUM BLD-SCNC: 4 MMOL/L (ref 3.4–5.3)
SODIUM SERPL-SCNC: 136 MMOL/L (ref 133–144)
TRIGL SERPL-MCNC: 208 MG/DL

## 2021-11-02 PROCEDURE — 80061 LIPID PANEL: CPT | Performed by: FAMILY MEDICINE

## 2021-11-02 PROCEDURE — 90471 IMMUNIZATION ADMIN: CPT | Performed by: FAMILY MEDICINE

## 2021-11-02 PROCEDURE — 80048 BASIC METABOLIC PNL TOTAL CA: CPT | Performed by: FAMILY MEDICINE

## 2021-11-02 PROCEDURE — 99213 OFFICE O/P EST LOW 20 MIN: CPT | Mod: 25 | Performed by: FAMILY MEDICINE

## 2021-11-02 PROCEDURE — 36415 COLL VENOUS BLD VENIPUNCTURE: CPT | Performed by: FAMILY MEDICINE

## 2021-11-02 PROCEDURE — 99386 PREV VISIT NEW AGE 40-64: CPT | Mod: 25 | Performed by: FAMILY MEDICINE

## 2021-11-02 PROCEDURE — 90715 TDAP VACCINE 7 YRS/> IM: CPT | Performed by: FAMILY MEDICINE

## 2021-11-02 ASSESSMENT — ENCOUNTER SYMPTOMS
SHORTNESS OF BREATH: 0
HEMATURIA: 0
DIZZINESS: 0
PALPITATIONS: 0
DYSURIA: 0
WEAKNESS: 0
ARTHRALGIAS: 1
DIARRHEA: 0
FREQUENCY: 0
EYE PAIN: 0
CHILLS: 0
ABDOMINAL PAIN: 0
HEMATOCHEZIA: 0
CONSTIPATION: 0
HEARTBURN: 0
NERVOUS/ANXIOUS: 0
MYALGIAS: 0
SORE THROAT: 0
COUGH: 0
NAUSEA: 0
HEADACHES: 0
PARESTHESIAS: 0
FEVER: 0

## 2021-11-02 ASSESSMENT — MIFFLIN-ST. JEOR: SCORE: 2005.71

## 2021-11-02 ASSESSMENT — PAIN SCALES - GENERAL: PAINLEVEL: EXTREME PAIN (9)

## 2021-11-02 NOTE — NURSING NOTE
Prior to immunization administration, verified patients identity using patient s name and date of birth. Please see Immunization Activity for additional information.     Screening Questionnaire for Adult Immunization    Are you sick today?   No   Do you have allergies to medications, food, a vaccine component or latex?   No   Have you ever had a serious reaction after receiving a vaccination?   No   Do you have a long-term health problem with heart, lung, kidney, or metabolic disease (e.g., diabetes), asthma, a blood disorder, no spleen, complement component deficiency, a cochlear implant, or a spinal fluid leak?  Are you on long-term aspirin therapy?   No   Do you have cancer, leukemia, HIV/AIDS, or any other immune system problem?   No   Do you have a parent, brother, or sister with an immune system problem?   No   In the past 3 months, have you taken medications that affect  your immune system, such as prednisone, other steroids, or anticancer drugs; drugs for the treatment of rheumatoid arthritis, Crohn s disease, or psoriasis; or have you had radiation treatments?   No   Have you had a seizure, or a brain or other nervous system problem?   No   During the past year, have you received a transfusion of blood or blood    products, or been given immune (gamma) globulin or antiviral drug?   No   For women: Are you pregnant or is there a chance you could become       pregnant during the next month?   No   Have you received any vaccinations in the past 4 weeks?   No     Immunization questionnaire answers were all negative.        Per orders of Dr. Alcantara, injection of tdap given by Lanie Westbrook MA. Patient instructed to remain in clinic for 15 minutes afterwards, and to report any adverse reaction to me immediately.       Screening performed by Lanie Westbrook MA on 11/2/2021 at 11:19 AM.

## 2021-11-08 ENCOUNTER — ANCILLARY PROCEDURE (OUTPATIENT)
Dept: MRI IMAGING | Facility: CLINIC | Age: 46
End: 2021-11-08
Attending: FAMILY MEDICINE
Payer: COMMERCIAL

## 2021-11-08 DIAGNOSIS — M25.512 CHRONIC LEFT SHOULDER PAIN: ICD-10-CM

## 2021-11-08 DIAGNOSIS — G89.29 CHRONIC LEFT SHOULDER PAIN: ICD-10-CM

## 2021-11-08 PROCEDURE — 73221 MRI JOINT UPR EXTREM W/O DYE: CPT | Mod: LT | Performed by: RADIOLOGY

## 2021-11-09 ENCOUNTER — MYC MEDICAL ADVICE (OUTPATIENT)
Dept: FAMILY MEDICINE | Facility: CLINIC | Age: 46
End: 2021-11-09
Payer: COMMERCIAL

## 2021-11-09 DIAGNOSIS — M25.512 CHRONIC LEFT SHOULDER PAIN: Primary | ICD-10-CM

## 2021-11-09 DIAGNOSIS — G89.29 CHRONIC LEFT SHOULDER PAIN: Primary | ICD-10-CM

## 2021-11-15 ENCOUNTER — OFFICE VISIT (OUTPATIENT)
Dept: ORTHOPEDICS | Facility: CLINIC | Age: 46
End: 2021-11-15
Attending: FAMILY MEDICINE
Payer: COMMERCIAL

## 2021-11-15 ENCOUNTER — ANCILLARY PROCEDURE (OUTPATIENT)
Dept: GENERAL RADIOLOGY | Facility: CLINIC | Age: 46
End: 2021-11-15
Attending: ORTHOPAEDIC SURGERY
Payer: COMMERCIAL

## 2021-11-15 VITALS
HEIGHT: 68 IN | BODY MASS INDEX: 39.42 KG/M2 | WEIGHT: 260.1 LBS | HEART RATE: 71 BPM | SYSTOLIC BLOOD PRESSURE: 117 MMHG | DIASTOLIC BLOOD PRESSURE: 78 MMHG

## 2021-11-15 DIAGNOSIS — M25.512 CHRONIC LEFT SHOULDER PAIN: ICD-10-CM

## 2021-11-15 DIAGNOSIS — M75.42 IMPINGEMENT SYNDROME OF SHOULDER, LEFT: ICD-10-CM

## 2021-11-15 DIAGNOSIS — M75.112 NONTRAUMATIC INCOMPLETE TEAR OF LEFT ROTATOR CUFF: ICD-10-CM

## 2021-11-15 DIAGNOSIS — G89.29 CHRONIC LEFT SHOULDER PAIN: Primary | ICD-10-CM

## 2021-11-15 DIAGNOSIS — M25.512 CHRONIC LEFT SHOULDER PAIN: Primary | ICD-10-CM

## 2021-11-15 DIAGNOSIS — G89.29 CHRONIC LEFT SHOULDER PAIN: ICD-10-CM

## 2021-11-15 PROCEDURE — 20610 DRAIN/INJ JOINT/BURSA W/O US: CPT | Mod: LT | Performed by: ORTHOPAEDIC SURGERY

## 2021-11-15 PROCEDURE — 73030 X-RAY EXAM OF SHOULDER: CPT | Mod: LT | Performed by: RADIOLOGY

## 2021-11-15 PROCEDURE — 99203 OFFICE O/P NEW LOW 30 MIN: CPT | Mod: 25 | Performed by: ORTHOPAEDIC SURGERY

## 2021-11-15 RX ORDER — BUPIVACAINE HYDROCHLORIDE 2.5 MG/ML
4 INJECTION, SOLUTION INFILTRATION; PERINEURAL
Status: DISCONTINUED | OUTPATIENT
Start: 2021-11-15 | End: 2023-04-14

## 2021-11-15 RX ORDER — TRIAMCINOLONE ACETONIDE 40 MG/ML
80 INJECTION, SUSPENSION INTRA-ARTICULAR; INTRAMUSCULAR
Status: DISCONTINUED | OUTPATIENT
Start: 2021-11-15 | End: 2023-04-14

## 2021-11-15 RX ADMIN — BUPIVACAINE HYDROCHLORIDE 4 ML: 2.5 INJECTION, SOLUTION INFILTRATION; PERINEURAL at 17:13

## 2021-11-15 RX ADMIN — TRIAMCINOLONE ACETONIDE 80 MG: 40 INJECTION, SUSPENSION INTRA-ARTICULAR; INTRAMUSCULAR at 17:13

## 2021-11-15 ASSESSMENT — PAIN SCALES - GENERAL: PAINLEVEL: NO PAIN (0)

## 2021-11-15 ASSESSMENT — MIFFLIN-ST. JEOR: SCORE: 2024.31

## 2021-11-15 NOTE — LETTER
11/15/2021         RE: Alberto Fuentes  [address] [place] [address]        Dear Colleague,    Thank you for referring your patient, Alberto Fuentes, to the [facility] [place]. Please see a copy of my visit note below.    CHIEF COMPLAINT:   Chief Complaint   Patient presents with     Left Shoulder - Pain     Onset: 18 months. NKI. Patient notes his shoulder just started to hurt. Pain has gotten more consistent. Pain is in the upper arm and anterior shoulder. Pain is worse. With lifting above head or reaching. He had some physical therapy      Shoulder Pain     through his work.    .  Alberto Fuentes is seen today in the [facility] for evaluation of left shoulder pain at the request of Dr. Krupa Zayas       HISTORY:  Alberto Fuentes is a 46 year old male, right  -hand dominant, who is seen in consultation at the request of Dr. Zayas for left shoulder pain that started  ~18 months ago. No specific injury. Notes onset of pain one day and has become more consistent. Locates pain to the upper arm/shoulder, aggravated with lifting above head or reaching, movements. Ok, minimal pain at rest, worse pain at night laying on left side, getting dressed. Treatment has been some Physical Therapy at work. Still feels like a lot of strength.    Denies neck pain, numbness and tingling.    History of right distal biceps tendon ~4 years ago.    Onset: ~18 months ago without incident.  Symptoms have been worsening since that time.  Aggrevated by: reaching, raising arm, lifting, pulling  Relieved by: rest  Present symptoms: pain with ADL's (dressing),  pain with overhead activities,  pain reaching behind back,  pain reaching out or away from body (flexion/ abduction),  positional night pain,  pain lifting,  no weakness with lifting,  Pain location: lateral shoulder, deltoid and upper arm, superior, anterior, posterior, trapezial and located throughout the  shoulder joint/diffuse  Pain severity: 0/10 currently but 6-10/10 with activities.  Pain quality: sharp and shooting  Frequency of symptoms: occasionally  Associated symptoms: none    Treatment up to this point: Physical Therapy, ice  Has not tried: Tylenol, NSAIDS and Corticosteroid injection   Prior history of related problems: maybe weight lifting injury years ago.    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    Other PMH:  has a past medical history of Overactive bladder.He has no past medical history of Arthritis, Cancer (H), Cerebral infarction (H), Congestive heart failure (H), COPD (chronic obstructive pulmonary disease) (H), Depressive disorder, Diabetes (H), Heart disease, History of blood transfusion, Hypertension, Thyroid disease, or Uncomplicated asthma.  Patient Active Problem List   Diagnosis     CARDIOVASCULAR SCREENING; LDL GOAL LESS THAN 160     Pharyngitis     Obesity     Overactive bladder     Hypertriglyceridemia     Incisional hernia     Morbid obesity (H)       Surgical Hx:  has a past surgical history that includes appendectomy (10/9/2008).    Medications:   Current Outpatient Medications:      Ibuprofen (ADVIL PO), , Disp: , Rfl:      sildenafil (REVATIO) 20 MG tablet, TAKE 1-5 TABLETS BY MOUTH 30 MINUTES TO 4 HOURS BEFORE INTERCOURSE (NEVER USE WITH NITROGLYCERIN, TERAZOSIN, OR DOXAZOSIN), Disp: 30 tablet, Rfl: 2    Allergies: No Known Allergies    Social Hx: ..   reports that he has been smoking cigarettes. He has a 6.00 pack-year smoking history. He has never used smokeless tobacco. He reports that he does not drink alcohol and does not use drugs.    Family Hx: family history includes Alzheimer Disease in his maternal grandfather; Coronary Artery Disease in his maternal grandmother; Diabetes in his father, maternal grandmother, and mother; Gastrointestinal Disease in his brother and mother; Heart Disease in his paternal grandmother; Hyperlipidemia  "in his maternal grandmother; Hypertension in his maternal grandmother; Lipids in his father and maternal grandmother..    REVIEW OF SYSTEMS: 10 point ROS neg other than the symptoms noted above in the HPI and PMH. Notables include  CONSTITUTIONAL:NEGATIVE for fever, chills, change in weight  INTEGUMENTARY/SKIN: NEGATIVE for worrisome rashes, moles or lesions  MUSCULOSKELETAL:See HPI above  NEURO: NEGATIVE for weakness, dizziness or paresthesias    PHYSICAL EXAM:  /78   Pulse 71   Ht 1.727 m (5' 8\")   Wt 118 kg (260 lb 1.6 oz)   BMI 39.55 kg/m     GENERAL APPEARANCE: healthy, alert, no distress  SKIN: no suspicious lesions or rashes  NEURO: Normal strength and tone, mentation intact and speech normal  PSYCH:  mentation appears normal and affect normal, not anxious  RESPIRATORY: No increased work of breathing.  VASCULAR: Radial pulses 2+ and brisk cappillary refill   LYMPH: no palpable axillary lymphadenopathy or cervical neck lymphadenopathy.      MUSCULOSKELETAL:    NECK:  Cervical range of motion: fullpainfree, and does not cause shoulder pain or reproduce shoulder pain.  Posterior cervical spine nontender to palpation over midline bony prominences  There is mild tender to palpation along neck paraspinals and trapezius muscles      RIGHT UPPER EXTREMITY:  Sensation intact to light touch in median, radial, ulnar and axillary nerve distributions  Palpable 2+ radial pulse, brisk capillary refill to all fingers, wwp  Intact epl fpl fdp edc wrist flexion/extension biceps triceps deltoid    RIGHT SHOULDER:  Shoulder Inspection: no swelling, bruising, discoloration, or obvious deformity or asymmetry  Proximal biceps \"yousuf\" deformity with distal biceps abnormality.  Range of Motion:   Active:forward flexion 170 degrees, external rotation  60 degrees, internal rotation  T12  Strength: forward flexion 5/5, External rotation 5/5  Liftoff: Able  Impingement: negative.  Special tests: Belly Press: Negative  Empty " Can: Negative    LEFT UPPER EXTREMITY:  Sensation intact to light touch in median, radial, ulnar and axillary nerve distributions  Palpable 2+ radial pulse, brisk capillary refill to all fingers, wwp  Intact epl fpl fdp edc wrist flexion/extension biceps triceps deltoid    LEFT SHOULDER:  Shoulder Inspection: no swelling, bruising, discoloration, or obvious deformity or asymmetry  Tender: acromion, anterior capsule, proximal bicep tendon, greater tuberosity, upper trapezius muscle and rhomboids  Non-tender: AC joint  Range of Motion:   Active:forward flexion 160 degrees, external rotation  60 degrees, internal rotation  hip pocket  Strength: forward flexion 5/5, External rotation 5-/5  Liftoff: Able  Impingement: all grade 2 positive  Special tests: Belly Press: Negative  Empty Can: Negative      X-RAY INTERPRETATION: 3 views left  shoulder obtained 11/15/2021 were reviewed personally in clinic today with the patient. On my review, No obvious fracture or dislocation. No obvious bony abnormality or lesion. Lateral subacromial osteophyte.      MRI left  shoulder:  11/8/2021  1. Moderate osteoarthrosis at the left shoulder acromioclavicular joint.  2. Mild to moderate osteoarthrosis at the left shoulder glenohumeral  joint with subchondral bone marrow edema along the posterior inferior  glenoid as well as likely degenerative tearing of the posterior  inferior glenoid labrum.  3. High-grade to near full-thickness tearing of the anterior fibers of  the left shoulder supraspinatus tendon at the footprint, with an  additional focal area of high-grade partial thickness tearing  involving the junctional fibers of the posterior  supraspinatus/anterior infraspinatus tendons. No tendon retraction.  4. Tendinosis of the left shoulder subscapularis tendon.  5. Very subtle fatty infiltration within the left shoulder teres minor  muscle, otherwise the rotator cuff musculature is intact.  6. Mild tendinosis of the intra-articular  biceps tendon.      ASSESSMENT: Alberto Fuentes is a 46 year old male, right  -hand dominant with chronic left shoulder pain, impingement, rotator cuff tendinosis with atraumatic partial thickness rotator cuff tear, subacromial bursitis.      PLAN:   * Reviewed imaging studies with patient. Also, clinical exam findings. Consistent with impingement, rotator cuff tendinitis.    Treatment:    * Rest  * Activity modification - avoid activities that aggravate symptoms or started symptoms at onset.  * NSAIDS - regular use for inflammation, with food, as long as no contra-indications. Please discuss with pcp if needed.  * Ice twice daily to three times daily, 15-20 minutes at a time  * heat may be beneficial prior to exercising  * Physical Therapy for strengthening, stretching and range of motion exercises of rotator cuff and periscapular stabilization.  * Tylenol as needed for pain  * Injections: cortisone injections may be beneficial to help decrease swelling and inflammation within the shoulder or bursa, and decrease pain. With decreased pain, Physical Therapy and exercises will be more effective and efficient. Patient elected to proceed.  * Return to clinic as needed.  * consider arthroscopic versus open surgery (for example: subacromial decompression, distal clavicle excision, rotator cuff repair versus debridement, biceps tenodesis versus tenotomy, etc.) in future if symptoms continue despite continued nonoperative treatment. However, 90% of patients with shoulder pain will respond to nonoperative treatment, as described above, and may never need surgery.    Yonny Fraser M.D., M.S.  Dept. of Orthopaedic Surgery  [facility]    Large Joint Injection/Arthocentesis: L subacromial bursa    Date/Time: 11/15/2021 5:13 PM  Performed by: Phong Landeros PA  Authorized by: Yonny Fraser MD     Indications:  Pain  Indications comment:  Impingement  Needle Size:  22 G  Guidance: landmark guided     Approach:  Posterolateral  Location:  Shoulder      Site:  L subacromial bursa  Medications:  80 mg triamcinolone 40 MG/ML; 4 mL bupivacaine 0.25 %  Outcome:  Tolerated well, no immediate complications  Procedure discussed: discussed risks, benefits, and alternatives    Consent Given by:  Patient  Prep: patient was prepped and draped in usual sterile fashion              Again, thank you for allowing me to participate in the care of your patient.        Sincerely,        Yonny Fraser MD

## 2021-11-15 NOTE — PROGRESS NOTES
CHIEF COMPLAINT:   Chief Complaint   Patient presents with     Left Shoulder - Pain     Onset: 18 months. NKI. Patient notes his shoulder just started to hurt. Pain has gotten more consistent. Pain is in the upper arm and anterior shoulder. Pain is worse. With lifting above head or reaching. He had some physical therapy      Shoulder Pain     through his work.    .  Alberto Fuentes is seen today in the [facility] for evaluation of left shoulder pain at the request of Dr. Krupa Zayas       HISTORY:  Alberto Fuentes is a 46 year old male, right  -hand dominant, who is seen in consultation at the request of Dr. Zayas for left shoulder pain that started  ~18 months ago. No specific injury. Notes onset of pain one day and has become more consistent. Locates pain to the upper arm/shoulder, aggravated with lifting above head or reaching, movements. Ok, minimal pain at rest, worse pain at night laying on left side, getting dressed. Treatment has been some Physical Therapy at work. Still feels like a lot of strength.    Denies neck pain, numbness and tingling.    History of right distal biceps tendon ~4 years ago.    Onset: ~18 months ago without incident.  Symptoms have been worsening since that time.  Aggrevated by: reaching, raising arm, lifting, pulling  Relieved by: rest  Present symptoms: pain with ADL's (dressing),  pain with overhead activities,  pain reaching behind back,  pain reaching out or away from body (flexion/ abduction),  positional night pain,  pain lifting,  no weakness with lifting,  Pain location: lateral shoulder, deltoid and upper arm, superior, anterior, posterior, trapezial and located throughout the shoulder joint/diffuse  Pain severity: 0/10 currently but 6-10/10 with activities.  Pain quality: sharp and shooting  Frequency of symptoms: occasionally  Associated symptoms: none    Treatment up to this point: Physical Therapy, ice  Has not tried: Tylenol,  NSAIDS and Corticosteroid injection   Prior history of related problems: maybe weight lifting injury years ago.    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    Other PMH:  has a past medical history of Overactive bladder.He has no past medical history of Arthritis, Cancer (H), Cerebral infarction (H), Congestive heart failure (H), COPD (chronic obstructive pulmonary disease) (H), Depressive disorder, Diabetes (H), Heart disease, History of blood transfusion, Hypertension, Thyroid disease, or Uncomplicated asthma.  Patient Active Problem List   Diagnosis     CARDIOVASCULAR SCREENING; LDL GOAL LESS THAN 160     Pharyngitis     Obesity     Overactive bladder     Hypertriglyceridemia     Incisional hernia     Morbid obesity (H)       Surgical Hx:  has a past surgical history that includes appendectomy (10/9/2008).    Medications:   Current Outpatient Medications:      Ibuprofen (ADVIL PO), , Disp: , Rfl:      sildenafil (REVATIO) 20 MG tablet, TAKE 1-5 TABLETS BY MOUTH 30 MINUTES TO 4 HOURS BEFORE INTERCOURSE (NEVER USE WITH NITROGLYCERIN, TERAZOSIN, OR DOXAZOSIN), Disp: 30 tablet, Rfl: 2    Allergies: No Known Allergies    Social Hx: ..   reports that he has been smoking cigarettes. He has a 6.00 pack-year smoking history. He has never used smokeless tobacco. He reports that he does not drink alcohol and does not use drugs.    Family Hx: family history includes Alzheimer Disease in his maternal grandfather; Coronary Artery Disease in his maternal grandmother; Diabetes in his father, maternal grandmother, and mother; Gastrointestinal Disease in his brother and mother; Heart Disease in his paternal grandmother; Hyperlipidemia in his maternal grandmother; Hypertension in his maternal grandmother; Lipids in his father and maternal grandmother..    REVIEW OF SYSTEMS: 10 point ROS neg other than the symptoms noted above in the HPI and PMH. Notables include  CONSTITUTIONAL:NEGATIVE for  "fever, chills, change in weight  INTEGUMENTARY/SKIN: NEGATIVE for worrisome rashes, moles or lesions  MUSCULOSKELETAL:See HPI above  NEURO: NEGATIVE for weakness, dizziness or paresthesias    PHYSICAL EXAM:  /78   Pulse 71   Ht 1.727 m (5' 8\")   Wt 118 kg (260 lb 1.6 oz)   BMI 39.55 kg/m     GENERAL APPEARANCE: healthy, alert, no distress  SKIN: no suspicious lesions or rashes  NEURO: Normal strength and tone, mentation intact and speech normal  PSYCH:  mentation appears normal and affect normal, not anxious  RESPIRATORY: No increased work of breathing.  VASCULAR: Radial pulses 2+ and brisk cappillary refill   LYMPH: no palpable axillary lymphadenopathy or cervical neck lymphadenopathy.      MUSCULOSKELETAL:    NECK:  Cervical range of motion: fullpainfree, and does not cause shoulder pain or reproduce shoulder pain.  Posterior cervical spine nontender to palpation over midline bony prominences  There is mild tender to palpation along neck paraspinals and trapezius muscles      RIGHT UPPER EXTREMITY:  Sensation intact to light touch in median, radial, ulnar and axillary nerve distributions  Palpable 2+ radial pulse, brisk capillary refill to all fingers, wwp  Intact epl fpl fdp edc wrist flexion/extension biceps triceps deltoid    RIGHT SHOULDER:  Shoulder Inspection: no swelling, bruising, discoloration, or obvious deformity or asymmetry  Proximal biceps \"yousuf\" deformity with distal biceps abnormality.  Range of Motion:   Active:forward flexion 170 degrees, external rotation  60 degrees, internal rotation  T12  Strength: forward flexion 5/5, External rotation 5/5  Liftoff: Able  Impingement: negative.  Special tests: Belly Press: Negative  Empty Can: Negative    LEFT UPPER EXTREMITY:  Sensation intact to light touch in median, radial, ulnar and axillary nerve distributions  Palpable 2+ radial pulse, brisk capillary refill to all fingers, wwp  Intact epl fpl fdp edc wrist flexion/extension biceps triceps " deltoid    LEFT SHOULDER:  Shoulder Inspection: no swelling, bruising, discoloration, or obvious deformity or asymmetry  Tender: acromion, anterior capsule, proximal bicep tendon, greater tuberosity, upper trapezius muscle and rhomboids  Non-tender: AC joint  Range of Motion:   Active:forward flexion 160 degrees, external rotation  60 degrees, internal rotation  hip pocket  Strength: forward flexion 5/5, External rotation 5-/5  Liftoff: Able  Impingement: all grade 2 positive  Special tests: Belly Press: Negative  Empty Can: Negative      X-RAY INTERPRETATION: 3 views left  shoulder obtained 11/15/2021 were reviewed personally in clinic today with the patient. On my review, No obvious fracture or dislocation. No obvious bony abnormality or lesion. Lateral subacromial osteophyte.      MRI left  shoulder:  11/8/2021  1. Moderate osteoarthrosis at the left shoulder acromioclavicular joint.  2. Mild to moderate osteoarthrosis at the left shoulder glenohumeral  joint with subchondral bone marrow edema along the posterior inferior  glenoid as well as likely degenerative tearing of the posterior  inferior glenoid labrum.  3. High-grade to near full-thickness tearing of the anterior fibers of  the left shoulder supraspinatus tendon at the footprint, with an  additional focal area of high-grade partial thickness tearing  involving the junctional fibers of the posterior  supraspinatus/anterior infraspinatus tendons. No tendon retraction.  4. Tendinosis of the left shoulder subscapularis tendon.  5. Very subtle fatty infiltration within the left shoulder teres minor  muscle, otherwise the rotator cuff musculature is intact.  6. Mild tendinosis of the intra-articular biceps tendon.      ASSESSMENT: Alberto Fuentes is a 46 year old male, right  -hand dominant with chronic left shoulder pain, impingement, rotator cuff tendinosis with atraumatic partial thickness rotator cuff tear, subacromial bursitis.      PLAN:   * Reviewed  imaging studies with patient. Also, clinical exam findings. Consistent with impingement, rotator cuff tendinitis.    Treatment:    * Rest  * Activity modification - avoid activities that aggravate symptoms or started symptoms at onset.  * NSAIDS - regular use for inflammation, with food, as long as no contra-indications. Please discuss with pcp if needed.  * Ice twice daily to three times daily, 15-20 minutes at a time  * heat may be beneficial prior to exercising  * Physical Therapy for strengthening, stretching and range of motion exercises of rotator cuff and periscapular stabilization.  * Tylenol as needed for pain  * Injections: cortisone injections may be beneficial to help decrease swelling and inflammation within the shoulder or bursa, and decrease pain. With decreased pain, Physical Therapy and exercises will be more effective and efficient. Patient elected to proceed.  * Return to clinic as needed.  * consider arthroscopic versus open surgery (for example: subacromial decompression, distal clavicle excision, rotator cuff repair versus debridement, biceps tenodesis versus tenotomy, etc.) in future if symptoms continue despite continued nonoperative treatment. However, 90% of patients with shoulder pain will respond to nonoperative treatment, as described above, and may never need surgery.    Yonny Fraser M.D., M.S.  Dept. of Orthopaedic Surgery  [facility]    Large Joint Injection/Arthocentesis: L subacromial bursa    Date/Time: 11/15/2021 5:13 PM  Performed by: Phong Landeros PA  Authorized by: Yonny Fraser MD     Indications:  Pain  Indications comment:  Impingement  Needle Size:  22 G  Guidance: landmark guided    Approach:  Posterolateral  Location:  Shoulder      Site:  L subacromial bursa  Medications:  80 mg triamcinolone 40 MG/ML; 4 mL bupivacaine 0.25 %  Outcome:  Tolerated well, no immediate complications  Procedure discussed: discussed risks, benefits, and alternatives     Consent Given by:  Patient  Prep: patient was prepped and draped in usual sterile fashion

## 2021-11-26 DIAGNOSIS — N52.9 ERECTILE DYSFUNCTION, UNSPECIFIED ERECTILE DYSFUNCTION TYPE: ICD-10-CM

## 2021-11-26 RX ORDER — SILDENAFIL CITRATE 20 MG/1
TABLET ORAL
Qty: 30 TABLET | Refills: 2 | Status: SHIPPED | OUTPATIENT
Start: 2021-11-26 | End: 2023-09-19

## 2021-12-11 ENCOUNTER — THERAPY VISIT (OUTPATIENT)
Dept: PHYSICAL THERAPY | Facility: CLINIC | Age: 46
End: 2021-12-11
Attending: ORTHOPAEDIC SURGERY
Payer: COMMERCIAL

## 2021-12-11 DIAGNOSIS — G89.29 CHRONIC LEFT SHOULDER PAIN: ICD-10-CM

## 2021-12-11 DIAGNOSIS — M25.512 CHRONIC LEFT SHOULDER PAIN: ICD-10-CM

## 2021-12-11 DIAGNOSIS — M75.112 NONTRAUMATIC INCOMPLETE TEAR OF LEFT ROTATOR CUFF: ICD-10-CM

## 2021-12-11 DIAGNOSIS — M75.42 IMPINGEMENT SYNDROME OF SHOULDER, LEFT: ICD-10-CM

## 2021-12-11 PROCEDURE — 97110 THERAPEUTIC EXERCISES: CPT | Mod: GP | Performed by: PHYSICAL THERAPIST

## 2021-12-11 PROCEDURE — 97161 PT EVAL LOW COMPLEX 20 MIN: CPT | Mod: GP | Performed by: PHYSICAL THERAPIST

## 2021-12-11 NOTE — PROGRESS NOTES
Physical Therapy Initial Evaluation  Subjective:    Patient Health History  Alberto Fuentes being seen for L shoulder pain.     Problem began: 6/15/2019.   Problem occurred: unknown   Pain is reported as 2/10 on pain scale.  General health as reported by patient is good.  Pertinent medical history includes: none.   Red flags:  None as reported by patient.  Medical allergies: none.   Surgeries include:  Other. Other surgery history details: hernia.    Current medications:  None.    Current occupation .   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                                  Pt has an 18month history of L shoulder pain that occurred without known injury. He reports pain with behind back and out to the side movements that are worse if holding a light object. He has dropped items frequently due to pain. He was previously involved with heavy weight lifting however reports not having pain in shoulders at that time. He had an injection into L shoulder and has recently been feeling better.    L shoulder MRI 11/8/21:  IMPRESSION:  1. Moderate osteoarthrosis at the left shoulder acromioclavicular  joint.  2. Mild to moderate osteoarthrosis at the left shoulder glenohumeral  joint with subchondral bone marrow edema along the posterior inferior  glenoid as well as likely degenerative tearing of the posterior  inferior glenoid labrum.  3. High-grade to near full-thickness tearing of the anterior fibers of  the left shoulder supraspinatus tendon at the footprint, with an  additional focal area of high-grade partial thickness tearing  involving the junctional fibers of the posterior  supraspinatus/anterior infraspinatus tendons. No tendon retraction.  4. Tendinosis of the left shoulder subscapularis tendon.  5. Very subtle fatty infiltration within the left shoulder teres minor  muscle, otherwise the rotator cuff musculature is intact.  6. Mild tendinosis of the intra-articular biceps  tendon.    Objective:        SHOULDER:    Cervical Screen: full and painfree cervical motion    Shoulder:   PROM L PROM R AROM L AROM R MMT L MMT R   Flex   150 171 4/5 5/5   Abd   146 180+ 4/5 5/5   Full Can     nt /5   Empty Can     nt 5/5   IR   L4 T9 5/5 5/5   ER   45 72 4/5 5/5   Ext/IR           Scapulothoraic Rhythm: increased upward rotation on L vs R    Palpation: mild TTP supraspinatus belly    Special tests:   L R   Impingement     Neer's + neg   Hawkin's-Jordan + neg   Coracoid Impingement     Internal impingement     Labral     Anterior Slide     Dufur's     Crank     Instability     Apprehension (anterior)     Relocation (anterior)     Anterior Load & Shift     Posterior Load & Shift     Posterior instability (with 90 degrees flex)     Multi-Directional Instability      Sulcus     Biceps      Speed's     Rotator Cuff Tear     Drop Arm neg neg   Belly Press     Lift off  + neg     GH Mobility  L  R   Posterior glide limited limited   Inferior glide wnl wnl   Anterior glide wnl wnl               System    Physical Exam    General     ROS    Assessment/Plan:    Patient is a 46 year old male with left side shoulder complaints.    Patient has the following significant findings with corresponding treatment plan.                Diagnosis 1:  L shoulder pain  Pain -  hot/cold therapy, US, electric stimulation, manual therapy, education and home program  Decreased ROM/flexibility - manual therapy, therapeutic exercise, therapeutic activity and home program  Decreased joint mobility - manual therapy, therapeutic exercise, therapeutic activity and home program  Decreased strength - therapeutic exercise, therapeutic activities and home program    Cumulative Therapy Evaluation is: Low complexity.    Previous and current functional limitations:  (See Goal Flow Sheet for this information)    Short term and Long term goals: (See Goal Flow Sheet for this information)     Communication ability:  Patient appears to be  able to clearly communicate and understand verbal and written communication and follow directions correctly.  Treatment Explanation - The following has been discussed with the patient:   RX ordered/plan of care  Anticipated outcomes  Possible risks and side effects  This patient would benefit from PT intervention to resume normal activities.   Rehab potential is good.    Frequency:  1 X week, once daily  Duration:  for 8 weeks (pt reports he plans for just 3 visits)  Discharge Plan:  Achieve all LTG.  Independent in home treatment program.  Reach maximal therapeutic benefit.    Please refer to the daily flowsheet for treatment today, total treatment time and time spent performing 1:1 timed codes.

## 2021-12-18 ENCOUNTER — THERAPY VISIT (OUTPATIENT)
Dept: PHYSICAL THERAPY | Facility: CLINIC | Age: 46
End: 2021-12-18
Payer: COMMERCIAL

## 2021-12-18 DIAGNOSIS — G89.29 CHRONIC LEFT SHOULDER PAIN: Primary | ICD-10-CM

## 2021-12-18 DIAGNOSIS — M25.512 CHRONIC LEFT SHOULDER PAIN: Primary | ICD-10-CM

## 2021-12-18 DIAGNOSIS — M75.42 IMPINGEMENT SYNDROME OF SHOULDER, LEFT: ICD-10-CM

## 2021-12-18 PROCEDURE — 97110 THERAPEUTIC EXERCISES: CPT | Mod: GP | Performed by: PHYSICAL THERAPIST

## 2021-12-18 PROCEDURE — 97112 NEUROMUSCULAR REEDUCATION: CPT | Mod: GP | Performed by: PHYSICAL THERAPIST

## 2021-12-22 ENCOUNTER — THERAPY VISIT (OUTPATIENT)
Dept: PHYSICAL THERAPY | Facility: CLINIC | Age: 46
End: 2021-12-22
Payer: COMMERCIAL

## 2021-12-22 DIAGNOSIS — G89.29 CHRONIC LEFT SHOULDER PAIN: Primary | ICD-10-CM

## 2021-12-22 DIAGNOSIS — M25.512 CHRONIC LEFT SHOULDER PAIN: Primary | ICD-10-CM

## 2021-12-22 DIAGNOSIS — M75.42 IMPINGEMENT SYNDROME OF SHOULDER, LEFT: ICD-10-CM

## 2021-12-22 PROCEDURE — 97110 THERAPEUTIC EXERCISES: CPT | Mod: GP | Performed by: PHYSICAL THERAPIST

## 2021-12-22 PROCEDURE — 97140 MANUAL THERAPY 1/> REGIONS: CPT | Mod: GP | Performed by: PHYSICAL THERAPIST

## 2022-01-14 ENCOUNTER — THERAPY VISIT (OUTPATIENT)
Dept: PHYSICAL THERAPY | Facility: CLINIC | Age: 47
End: 2022-01-14
Payer: COMMERCIAL

## 2022-01-14 DIAGNOSIS — G89.29 CHRONIC LEFT SHOULDER PAIN: ICD-10-CM

## 2022-01-14 DIAGNOSIS — M25.512 CHRONIC LEFT SHOULDER PAIN: ICD-10-CM

## 2022-01-14 PROCEDURE — 97140 MANUAL THERAPY 1/> REGIONS: CPT | Mod: GP | Performed by: PHYSICAL THERAPIST

## 2022-01-14 PROCEDURE — 97110 THERAPEUTIC EXERCISES: CPT | Mod: GP | Performed by: PHYSICAL THERAPIST

## 2022-01-28 ENCOUNTER — THERAPY VISIT (OUTPATIENT)
Dept: PHYSICAL THERAPY | Facility: CLINIC | Age: 47
End: 2022-01-28
Payer: COMMERCIAL

## 2022-01-28 DIAGNOSIS — M25.512 CHRONIC LEFT SHOULDER PAIN: ICD-10-CM

## 2022-01-28 DIAGNOSIS — G89.29 CHRONIC LEFT SHOULDER PAIN: ICD-10-CM

## 2022-01-28 PROCEDURE — 97110 THERAPEUTIC EXERCISES: CPT | Mod: GP | Performed by: PHYSICAL THERAPIST

## 2022-01-28 PROCEDURE — 97140 MANUAL THERAPY 1/> REGIONS: CPT | Mod: GP | Performed by: PHYSICAL THERAPIST

## 2022-01-28 NOTE — PROGRESS NOTES
SUBJECTIVE  Subjective: Pt reports the sleeper stretch felt like it was too much for him and he had pain in bed for severeal night so he stopped doing it.  The other exercises went fine.  So probably back to where he was now that it's settled down again   Current Pain level: 3/10   Changes in function:  Yes (See Goal flowsheet attached for changes in current functional level)     Adverse reaction to treatment or activity:  None    OBJECTIVE  Objective: AROM L shoulder flex: 148, ABD: 139.  ER is limited about 10 deg passively due to stiffness and pain.  Sleeper stretch did not cause pain when he was at 60 ABD instead of 90     ASSESSMENT  Alberto continues to require intervention to meet STG and LTG's: PT  Patient is progressing as expected.  Response to therapy has shown an improvement in  pain level and ROM   Progress made towards STG/LTG?  Yes (See Goal flowsheet attached for updates on achievement of STG and LTG)    PLAN  Current treatment program is being advanced to more complex exercises.    PTA/ATC plan:  N/A    Please refer to the daily flowsheet for treatment today, total treatment time and time spent performing 1:1 timed codes.

## 2022-02-04 ENCOUNTER — THERAPY VISIT (OUTPATIENT)
Dept: PHYSICAL THERAPY | Facility: CLINIC | Age: 47
End: 2022-02-04
Payer: COMMERCIAL

## 2022-02-04 DIAGNOSIS — M25.512 CHRONIC LEFT SHOULDER PAIN: ICD-10-CM

## 2022-02-04 DIAGNOSIS — G89.29 CHRONIC LEFT SHOULDER PAIN: ICD-10-CM

## 2022-02-04 PROCEDURE — 97112 NEUROMUSCULAR REEDUCATION: CPT | Mod: GP | Performed by: PHYSICAL THERAPIST

## 2022-02-04 PROCEDURE — 97110 THERAPEUTIC EXERCISES: CPT | Mod: GP | Performed by: PHYSICAL THERAPIST

## 2022-02-14 ENCOUNTER — THERAPY VISIT (OUTPATIENT)
Dept: PHYSICAL THERAPY | Facility: CLINIC | Age: 47
End: 2022-02-14
Payer: COMMERCIAL

## 2022-02-14 DIAGNOSIS — G89.29 CHRONIC LEFT SHOULDER PAIN: ICD-10-CM

## 2022-02-14 DIAGNOSIS — M25.512 CHRONIC LEFT SHOULDER PAIN: ICD-10-CM

## 2022-02-14 PROCEDURE — 97112 NEUROMUSCULAR REEDUCATION: CPT | Mod: GP | Performed by: PHYSICAL THERAPIST

## 2022-02-14 PROCEDURE — 97110 THERAPEUTIC EXERCISES: CPT | Mod: GP | Performed by: PHYSICAL THERAPIST

## 2022-02-15 NOTE — PROGRESS NOTES
DISCHARGE REPORT    Progress reporting period is from 12/11/21 to 2/14/22.       SUBJECTIVE  Subjective: The pain is pretty much gone now so that is encouraging.  And while the strength has improved it's not where he wants it yet for things like lifting overhead and swimming.  Would rather try to rehab but has also thought about his surgical options.  Would like to try on his own for a while    Current Pain level: 0/10.     Initial Pain level: 4/10.   Changes in function:  Yes (See Goal flowsheet attached for changes in current functional level)  Adverse reaction to treatment or activity: None    OBJECTIVE  Objective: AROM L shoulder flex: 150, ABD: 144, ER: 72, IR: T12. MMT L shoulder ER:4+/5.  (+) H-K impingement.     ASSESSMENT/PLAN  Updated problem list and treatment plan: Diagnosis 1:  Shoulder pain/DJD/RCT    STG/LTGs have been met or progress has been made towards goals:  Yes (See Goal flow sheet completed today.)  Assessment of Progress: The patient's condition is improving.  The patient has met all of their long term goals.  Self Management Plans:  Patient is independent in a home treatment program.  Alberto continues to require the following intervention to meet STG and LTG's:  PT intervention is no longer required to meet STG/LTG.    Recommendations:  This patient is ready to be discharged from therapy and continue their home treatment program.    Please refer to the daily flowsheet for treatment today, total treatment time and time spent performing 1:1 timed codes.

## 2023-01-14 ENCOUNTER — HEALTH MAINTENANCE LETTER (OUTPATIENT)
Age: 48
End: 2023-01-14

## 2023-04-14 ENCOUNTER — NURSE TRIAGE (OUTPATIENT)
Dept: FAMILY MEDICINE | Facility: CLINIC | Age: 48
End: 2023-04-14

## 2023-04-14 ENCOUNTER — OFFICE VISIT (OUTPATIENT)
Dept: FAMILY MEDICINE | Facility: CLINIC | Age: 48
End: 2023-04-14
Payer: COMMERCIAL

## 2023-04-14 VITALS
DIASTOLIC BLOOD PRESSURE: 80 MMHG | WEIGHT: 269 LBS | BODY MASS INDEX: 40.77 KG/M2 | SYSTOLIC BLOOD PRESSURE: 120 MMHG | TEMPERATURE: 98.5 F | HEART RATE: 89 BPM | RESPIRATION RATE: 16 BRPM | OXYGEN SATURATION: 97 % | HEIGHT: 68 IN

## 2023-04-14 DIAGNOSIS — L03.90 CELLULITIS, UNSPECIFIED CELLULITIS SITE: ICD-10-CM

## 2023-04-14 DIAGNOSIS — L02.92 BOIL: Primary | ICD-10-CM

## 2023-04-14 PROCEDURE — 99213 OFFICE O/P EST LOW 20 MIN: CPT | Performed by: PHYSICIAN ASSISTANT

## 2023-04-14 RX ORDER — DOXYCYCLINE 100 MG/1
100 CAPSULE ORAL 2 TIMES DAILY
Qty: 20 CAPSULE | Refills: 0 | Status: SHIPPED | OUTPATIENT
Start: 2023-04-14 | End: 2023-04-24

## 2023-04-14 ASSESSMENT — PAIN SCALES - GENERAL: PAINLEVEL: MODERATE PAIN (4)

## 2023-04-14 NOTE — PROGRESS NOTES
Assessment & Plan     Boil  Discussed draining but with exam and patient preference we decided to start wtih antibiotic, is already much improved and draining on its own  Continue warm compresses/soaking as that is helping  Take with food. Side effects discussed.  Call with worsening symptoms or if no improvement in 1 days.  Analgesics for pain with food as needed.  To er with fevers again or worsening edema/pain    - doxycycline hyclate (VIBRAMYCIN) 100 MG capsule; Take 1 capsule (100 mg) by mouth 2 times daily for 10 days With food    Cellulitis, unspecified cellulitis site    - doxycycline hyclate (VIBRAMYCIN) 100 MG capsule; Take 1 capsule (100 mg) by mouth 2 times daily for 10 days With food    [facility]    Cortney Dominguez is a 47 year old, presenting for the following health issues:  Mass         View : No data to display.              History of Present Illness       Reason for visit:  Cist  Symptom onset:  1-3 days ago  Symptom intensity:  Moderate  Symptom progression:  Improving  Had these symptoms before:  No    He eats 2-3 servings of fruits and vegetables daily.He consumes 0 sweetened beverage(s) daily.He exercises with enough effort to increase his heart rate 9 or less minutes per day.  He exercises with enough effort to increase his heart rate 3 or less days per week.   He is taking medications regularly.       Improving boil on right thigh.     No fever since this am.   Went biking and then had pain on right inner thigh. Boil got very large. Soaking in baths helped and now that it has been draining bloody pus it has significantly gone down in size. Pain is much improved from last night per patient. Is more tired today.   No h/o boils.   Recent antibiotics? no        From nursing note:  Seema Rose, RN         4/14/23  9:10 AM  Note  Patient calling in today and reports a cyst/boil under the skin on the inner thigh. Patient reports it  "was baseball size yesterday. Patient had a fever and overall did not feel well. Patient took a warm bath and had a heating pad on it. It ended up opening and draining last night. Patient felt better and no more fever and pain is about a 2. The area is continuing to drain and weep. Made patient an appointment to be seen today.     Disposition: SEE IN OFFICE TODAY           Future Appointments 4/14/2023 - 10/11/2023       Date Visit Type Length Department Provider       4/14/2023  1:00 PM OFFICE VISIT 30 min [facility] Shawnee Shabazz PA-C     Location Instructions:      [facility] is located at [address], just north of the intersection with [address]. The patient parking lot and entrance is on the building s north side.                                Reason for Disposition    Boil > 2 inches across (> 5 cm; larger than a golf ball or ping pong ball)    Additional Information    Negative: Widespread rash and bright red, sunburn-like and too weak to stand    Negative: Sounds like a life-threatening emergency to the triager    Answer Assessment - Initial Assessment Questions  1. APPEARANCE of BOIL: \"What does the boil look like?\"       Baseball, golf size ball lump under skin  2. LOCATION: \"Where is the boil located?\"       Inner right thigh  3. NUMBER: \"How many boils are there?\"       Two, have decreased in size today  4. SIZE: \"How big is the boil?\" (e.g., inches, cm; compare to size of a coin or other object)      Golf ball   5. ONSET: \"When did the boil start?\"      Wednesday evening   6. PAIN: \"Is there any pain?\" If Yes, ask: \"How bad is the pain?\"   (Scale 1-10; or mild, moderate, severe)      Yes, pain is a 2  7. FEVER: \"Do you have a fever?\" If Yes, ask: \"What is it, how was it measured, and when did it start?\"   Yes had fever yesterday, felt unwell. No fever today  8. SOURCE: \"Have you been around anyone with boils or other Staph infections?\" " "\"Have you ever had boils before?\"      No  9. OTHER SYMPTOMS: \"Do you have any other symptoms?\" (e.g., shaking chills, weakness, rash elsewhere on body)      Did not feel well yesterday and had fever. Fever broke last night. Feeling better today.  10. PREGNANCY: \"Is there any chance you are pregnant?\" \"When was your last menstrual period?\"  NA        *No Answer*    Protocols used: BOIL (SKIN ABSCESS)-A-OH     Seema Rose RN            Review of Systems   Constitutional, HEENT, cardiovascular, pulmonary, GI, , musculoskeletal, neuro, skin, endocrine and psych systems are negative, except as otherwise noted.      Objective    /80   Pulse 89   Temp 98.5  F (36.9  C) (Tympanic)   Resp 16   Ht 1.727 m (5' 8\")   Wt 122 kg (269 lb)   SpO2 97%   BMI 40.90 kg/m    Body mass index is 40.9 kg/m .  Physical Exam   GENERAL: alert, no distress and obese  RESP: lungs clear to auscultation - no rales, rhonchi or wheezes  CV: regular rate and rhythm, normal S1 S2, no S3 or S4, no murmur, click or rub, no peripheral edema and peripheral pulses strong  MS: no gross musculoskeletal defects noted, no edema  SKIN: {:R inner thigh-there is a several cm area of pink well demarcated area (wife has outlined on there with marker where it was swollen before draining and this is same area of pink) cental to think there is an area with two holes that are draining a small amount of bloody purulent material. Not tender. Slightly warm to touch.     NEURO: Normal strength and tone, mentation intact and speech normal  PSYCH: mentation appears normal, affect normal/bright                  "

## 2023-04-14 NOTE — TELEPHONE ENCOUNTER
"Patient calling in today and reports a cyst/boil under the skin on the inner thigh. Patient reports it was baseball size yesterday. Patient had a fever and overall did not feel well. Patient took a warm bath and had a heating pad on it. It ended up opening and draining last night. Patient felt better and no more fever and pain is about a 2. The area is continuing to drain and weep. Made patient an appointment to be seen today.    Disposition: SEE IN OFFICE TODAY  Future Appointments 4/14/2023 - 10/11/2023      Date Visit Type Length Department Provider     4/14/2023  1:00 PM OFFICE VISIT 30 min [facility] Shawnee Shabazz PA-C    Location Instructions:     [facility] is located at [address], just north of the intersection with [address] [facility]. The patient parking lot and entrance is on the building s north side.                     Reason for Disposition    Boil > 2 inches across (> 5 cm; larger than a golf ball or ping pong ball)    Additional Information    Negative: Widespread rash and bright red, sunburn-like and too weak to stand    Negative: Sounds like a life-threatening emergency to the triager    Answer Assessment - Initial Assessment Questions  1. APPEARANCE of BOIL: \"What does the boil look like?\"       Baseball, golf size ball lump under skin  2. LOCATION: \"Where is the boil located?\"       Inner right thigh  3. NUMBER: \"How many boils are there?\"       Two, have decreased in size today  4. SIZE: \"How big is the boil?\" (e.g., inches, cm; compare to size of a coin or other object)      Golf ball   5. ONSET: \"When did the boil start?\"      Wednesday evening   6. PAIN: \"Is there any pain?\" If Yes, ask: \"How bad is the pain?\"   (Scale 1-10; or mild, moderate, severe)      Yes, pain is a 2  7. FEVER: \"Do you have a fever?\" If Yes, ask: \"What is it, how was it measured, and when did it start?\"   Yes had fever yesterday, felt unwell. No fever today  8. " Cephalexin 500 Mg  Last OV relevant to medication: 3-11-19  Last refill date: 6-20-19  #/refills: 0  When pt was asked to return for OV: 6 mo.  bp check  Upcoming appt/reason: 9-30-19  Recent labs: 6-17-19: Urine culture "SOURCE: \"Have you been around anyone with boils or other Staph infections?\" \"Have you ever had boils before?\"      No  9. OTHER SYMPTOMS: \"Do you have any other symptoms?\" (e.g., shaking chills, weakness, rash elsewhere on body)      Did not feel well yesterday and had fever. Fever broke last night. Feeling better today.  10. PREGNANCY: \"Is there any chance you are pregnant?\" \"When was your last menstrual period?\"  NA        *No Answer*    Protocols used: BOIL (SKIN ABSCESS)-A-SALTY Rose RN      "

## 2023-05-18 ENCOUNTER — NURSE TRIAGE (OUTPATIENT)
Dept: FAMILY MEDICINE | Facility: CLINIC | Age: 48
End: 2023-05-18
Payer: COMMERCIAL

## 2023-05-18 ENCOUNTER — HOSPITAL ENCOUNTER (EMERGENCY)
Facility: CLINIC | Age: 48
Discharge: HOME | End: 2023-05-18
Attending: EMERGENCY MEDICINE | Admitting: EMERGENCY MEDICINE
Payer: COMMERCIAL

## 2023-05-18 ENCOUNTER — OFFICE VISIT (OUTPATIENT)
Dept: URGENT CARE | Facility: URGENT CARE | Age: 48
End: 2023-05-18
Payer: COMMERCIAL

## 2023-05-18 VITALS
TEMPERATURE: 99.9 F | RESPIRATION RATE: 16 BRPM | DIASTOLIC BLOOD PRESSURE: 95 MMHG | BODY MASS INDEX: 40.9 KG/M2 | WEIGHT: 269 LBS | HEART RATE: 91 BPM | OXYGEN SATURATION: 98 % | SYSTOLIC BLOOD PRESSURE: 166 MMHG

## 2023-05-18 VITALS
SYSTOLIC BLOOD PRESSURE: 130 MMHG | HEART RATE: 103 BPM | WEIGHT: 268 LBS | DIASTOLIC BLOOD PRESSURE: 82 MMHG | TEMPERATURE: 101 F | OXYGEN SATURATION: 99 % | RESPIRATION RATE: 12 BRPM | BODY MASS INDEX: 40.75 KG/M2

## 2023-05-18 DIAGNOSIS — R00.0 TACHYCARDIA: ICD-10-CM

## 2023-05-18 DIAGNOSIS — L02.419 CELLULITIS AND ABSCESS OF LEG: ICD-10-CM

## 2023-05-18 DIAGNOSIS — R50.9 FEVER, UNSPECIFIED FEVER CAUSE: ICD-10-CM

## 2023-05-18 DIAGNOSIS — L03.119 CELLULITIS AND ABSCESS OF LEG: ICD-10-CM

## 2023-05-18 DIAGNOSIS — L02.214 ABSCESS OF GROIN, LEFT: Primary | ICD-10-CM

## 2023-05-18 DIAGNOSIS — L03.314 CELLULITIS OF LEFT GROIN: ICD-10-CM

## 2023-05-18 PROCEDURE — 99214 OFFICE O/P EST MOD 30 MIN: CPT | Performed by: NURSE PRACTITIONER

## 2023-05-18 PROCEDURE — 99283 EMERGENCY DEPT VISIT LOW MDM: CPT | Performed by: EMERGENCY MEDICINE

## 2023-05-18 PROCEDURE — 10060 I&D ABSCESS SIMPLE/SINGLE: CPT | Mod: LT | Performed by: EMERGENCY MEDICINE

## 2023-05-18 PROCEDURE — 99284 EMERGENCY DEPT VISIT MOD MDM: CPT | Mod: 25 | Performed by: EMERGENCY MEDICINE

## 2023-05-18 PROCEDURE — 10060 I&D ABSCESS SIMPLE/SINGLE: CPT | Performed by: EMERGENCY MEDICINE

## 2023-05-18 RX ORDER — SULFAMETHOXAZOLE/TRIMETHOPRIM 800-160 MG
1 TABLET ORAL 2 TIMES DAILY
Qty: 10 TABLET | Refills: 0 | Status: SHIPPED | OUTPATIENT
Start: 2023-05-18 | End: 2023-05-23

## 2023-05-18 ASSESSMENT — ACTIVITIES OF DAILY LIVING (ADL): ADLS_ACUITY_SCORE: 33

## 2023-05-18 NOTE — ED TRIAGE NOTES
Cyst to bilateral inner thighs. Has this in April and it burst open. Was put on antibiotics and it went away. But now has one on left thigh that hasn't opened. Was seen in [facility] and sent here d/t fever.

## 2023-05-18 NOTE — TELEPHONE ENCOUNTER
"Patient called reporting a new abscess on his left thigh that is similar to before. Not responding to home care. Requesting to be prescribed an antibiotic by phone without an appointment.     Per protocol, advised patient to be seen at urgent care [facility]. Patient declined to be seen today and wanted to schedule an appointment for tomorrow. Patient declined only available appointment tomorrow. Advised patient again to be seen at urgent care [facility]. Educated patient that worsening infection can become sepsis. Patient eventually agreed with this, however, was unclear if he was going tonight or tomorrow. Patient had no further questions at this time.     Lenore Blackman RN     Reason for Disposition    Boil > 2 inches across (> 5 cm; larger than a golf ball or ping pong ball)    Additional Information    Negative: Widespread rash and bright red, sunburn-like and too weak to stand    Negative: Sounds like a life-threatening emergency to the triager    Negative: Painful lump or swelling at opening to anus (rectum)    Negative: Painful lump or swelling at opening to vagina (on labia)    Negative: Painful lump or swelling on scrotum    Negative: Doesn't match the SYMPTOMS of a boil    Negative: Widespread red rash    Negative: Patient sounds very sick or weak to the triager    Negative: Black (necrotic) color or blisters develop in wound    Negative: SEVERE pain (e.g., excruciating)    Negative: Fever > 100.4 F (38.0 C)    Negative: Red streak from area of infection    Answer Assessment - Initial Assessment Questions  1. APPEARANCE of BOIL: \"What does the boil look like?\"       Hard and fluid filled. Inflamed. End of the day: will take a bath, size will reduce. Through out the day, it will become larger and fluid filled. Not sure what color fluid is.   2. LOCATION: \"Where is the boil located?\"       Left inner thigh  3. NUMBER: \"How many boils are there?\"       One  4. SIZE: \"How big is the boil?\" (e.g., inches, " "cm; compare to size of a coin or other object)      3 inches long, 1 inch wide.   5. ONSET: \"When did the boil start?\"      Tuesday  6. PAIN: \"Is there any pain?\" If Yes, ask: \"How bad is the pain?\"   (Scale 1-10; or mild, moderate, severe)      \"Sore\" when walking or sitting down. Rates pain 5/10.   7. FEVER: \"Do you have a fever?\" If Yes, ask: \"What is it, how was it measured, and when did it start?\"       99 degrees.   8. SOURCE: \"Have you been around anyone with boils or other Staph infections?\" \"Have you ever had boils before?\"      Yes had a boil before  9. OTHER SYMPTOMS: \"Do you have any other symptoms?\" (e.g., shaking chills, weakness, rash elsewhere on body)      Feeling pretty tired today. Soaked in tub twice today.    Protocols used: BOIL (SKIN ABSCESS)-A-[place]      "

## 2023-05-18 NOTE — PATIENT INSTRUCTIONS
Go to emergency room for further evaluation of left groin abscess with cellulitis, fever, tachycardia

## 2023-05-18 NOTE — PROGRESS NOTES
Assessment & Plan     Abscess of groin, left    Cellulitis of left groin    Fever, unspecified fever cause    Tachycardia       Recommend further evaluation in emergency room for left groin abscess with cellulitis with fever and tachycardia as cannot rule out sepsis in urgent care. Per [employer], Ed evaluation indicated as may need IV abx. Patient agreeable and declines ambulance. He is discharged in stable condition.     Elida Manrique NP  [facility] [place]    Cortney Dominguez is a 47 year old male who presents to clinic today for the following health issues:  Chief Complaint   Patient presents with     Cyst     Per pt Leg/groin area, rides his bicycle often and enough to create a boil.  Where the left leg joins the buttock. Has been bathing to relieve the stress and make it feel better.      Patient presents for evaluation of lump on left upper leg/groin. Symptoms have been present for 2 days and have been worsening. Associated symptoms: redness, swelling, pain. Pain is moderate. Denies drainage. Fever of 99F started yesterday and today up to 101F.  He bicycles often and has been bathing which helps temporarily.     He was evaluated in primary care 4/14/23 for boil and cellulitis of right leg and treated with doxycycline and was advised to go to ED with fever and that resolved and he has one on the other leg now.     Problem list, Medication list, Allergies, and Medical history reviewed in EPIC.    ROS:  Review of systems negative except for noted above        Objective    /82   Pulse 103   Temp (!) 101  F (38.3  C) (Tympanic)   Resp 12   Wt 121.6 kg (268 lb)   SpO2 99%   BMI 40.75 kg/m    Physical Exam  Constitutional:       General: He is not in acute distress.     Appearance: He is not toxic-appearing or diaphoretic.   Cardiovascular:      Rate and Rhythm: Regular rhythm. Tachycardia present.      Heart sounds: Normal heart sounds.   Pulmonary:      Effort: Pulmonary effort  is normal.      Breath sounds: Normal breath sounds.   Skin:     General: Skin is warm.      Findings: Erythema present.      Comments: Large abscess left upper groin with approx 6 inches surrounding erythema surrounding with bloody drainage   Neurological:      Mental Status: He is alert.

## 2023-05-19 NOTE — DISCHARGE INSTRUCTIONS
Start performing warm wet soaks in a bath or Sitz bath several times per day over the wound.  Cover the wound with dry gauze when not soaking. Do this until the wound heals.    Please return to the ED immediately if you notice:  Fever or chills   Reaccumulation of pus in the area   Increased pain or redness   Red streaks   Increased swelling

## 2023-05-19 NOTE — ED PROVIDER NOTES
History   No chief complaint on file.    HPI  Alberto Fuentes is a 47 year old male who presents for skin lesion.  Localized to left upper inner thight.  Developed past several days. He does report fever up to 100.3F.  He does have a history of past skin infection.  Does not report headache, chest pain, shortness of breath, abdominal pain, nausea, vomiting, diarrhea, or weakness.  Does have recent antibiotic use; doxycycline about 1 month ago.  No other complaints.     I reviewed the patient's clinic visit from 4/14/2023 for boil treated with doxycycline.  I reviewed the patient's urgent care visit from today, 5/18/2023, concern for fever and was sent here for further evaluation.    Allergies:  Allergies   Allergen Reactions     Seasonal Allergies        Problem List:    Patient Active Problem List    Diagnosis Date Noted     Morbid obesity (H) 11/02/2021     Priority: Medium     Incisional hernia 08/18/2014     Priority: Medium     Hypertriglyceridemia 12/12/2013     Priority: Medium     Obesity 09/06/2011     Priority: Medium     Overactive bladder      Priority: Medium     Pharyngitis 03/11/2011     Priority: Medium     CARDIOVASCULAR SCREENING; LDL GOAL LESS THAN 160 10/31/2010     Priority: Medium        Past Medical History:    Past Medical History:   Diagnosis Date     Overactive bladder        Past Surgical History:    Past Surgical History:   Procedure Laterality Date     APPENDECTOMY  10/9/2008       Family History:    Family History   Problem Relation Age of Onset     Gastrointestinal Disease Mother         crohns     Diabetes Mother      Alzheimer Disease Maternal Grandfather      Heart Disease Paternal Grandmother      Gastrointestinal Disease Brother         crohns     Diabetes Father      Lipids Father      Diabetes Maternal Grandmother      Hypertension Maternal Grandmother      Lipids Maternal Grandmother      Coronary Artery Disease Maternal Grandmother      Hyperlipidemia Maternal Grandmother         Social History:  Marital Status:   [2]  Social History     Tobacco Use     Smoking status: Every Day     Packs/day: 0.75     Years: 8.00     Pack years: 6.00     Types: Cigarettes     Passive exposure: Current     Smokeless tobacco: Never     Tobacco comments:     quit 2010   Vaping Use     Vaping status: Never Used   Substance Use Topics     Alcohol use: No     Drug use: No        Medications:    sulfamethoxazole-trimethoprim (BACTRIM DS) 800-160 MG tablet  Ibuprofen (ADVIL PO)  sildenafil (REVATIO) 20 MG tablet          Review of Systems    Physical Exam   BP: (!) 166/95  Pulse: 91  Temp: 99.9  F (37.7  C)  Resp: 16  Weight: 122 kg (269 lb)  SpO2: 98 %      Physical Exam  Skin:     Comments: Erythema, fluctuance, tenderness over the left inner upper thigh with erythema spreading through the inner thigh to the mid thigh.         ED [facility]    PROCEDURE: -Incision/Drainage    Date/Time: 5/18/2023 7:18 PM    Performed by: Johnny Casey MD  Authorized by: Johnny Casey MD    Risks, benefits and alternatives discussed.      LOCATION:      Type:  Abscess    Location:  Lower extremity    Lower extremity location:  Leg    Leg location:  L upper leg    PRE-PROCEDURE DETAILS:     Skin preparation:  Antiseptic wash    PROCEDURE TYPE:     Complexity:  Complex    ANESTHESIA (see MAR for exact dosages):     Anesthesia method:  Local infiltration    Local anesthetic:  Lidocaine 1% WITH epi    PROCEDURE DETAILS:     Needle aspiration: no      Incision types:  Single straight    Incision depth:  Dermal    Scalpel blade:  11    Wound management:  Probed and deloculated    Drainage:  Purulent    Drainage amount:  Moderate    Wound treatment:  Wound left open    Packing materials:  None    PROCEDURE    Patient Tolerance:  Patient tolerated the procedure well with no immediate complications                Critical Care time:  none               No  results found for this or any previous visit (from the past 24 hour(s)).    Medications - No data to display    Assessments & Plan (with Medical Decision Making)   Differential includes cellulitis, abscess.  He remained stable. An I&D was performed, see associated procedure note.  He will be prescribed abx.  Will follow up in the ED as needed or return sooner for worsening. The patient was in agreement with this plan.     I have reviewed the nursing notes.    I have reviewed the findings, diagnosis, plan and need for follow up with the patient.           Medical Decision Making  The patient's presentation was of moderate complexity (an acute illness with systemic symptoms).    The patient's evaluation involved:  review of external note(s) from 2 sources (see separate area of note for details)    The patient's management necessitated moderate risk (prescription drug management including medications given in the ED).        New Prescriptions    SULFAMETHOXAZOLE-TRIMETHOPRIM (BACTRIM DS) 800-160 MG TABLET    Take 1 tablet by mouth 2 times daily for 5 days       Final diagnoses:   Cellulitis and abscess of leg       5/18/2023   [facility] EMERGENCY DEPT     Johnny Casey MD  05/18/23 [id number]

## 2023-09-11 ENCOUNTER — MYC MEDICAL ADVICE (OUTPATIENT)
Dept: FAMILY MEDICINE | Facility: CLINIC | Age: 48
End: 2023-09-11
Payer: COMMERCIAL

## 2023-09-19 ENCOUNTER — OFFICE VISIT (OUTPATIENT)
Dept: FAMILY MEDICINE | Facility: CLINIC | Age: 48
End: 2023-09-19
Payer: COMMERCIAL

## 2023-09-19 VITALS
DIASTOLIC BLOOD PRESSURE: 73 MMHG | RESPIRATION RATE: 16 BRPM | BODY MASS INDEX: 37.2 KG/M2 | WEIGHT: 237 LBS | HEIGHT: 67 IN | SYSTOLIC BLOOD PRESSURE: 110 MMHG | TEMPERATURE: 97.4 F | HEART RATE: 71 BPM | OXYGEN SATURATION: 98 %

## 2023-09-19 DIAGNOSIS — N52.9 ERECTILE DYSFUNCTION, UNSPECIFIED ERECTILE DYSFUNCTION TYPE: ICD-10-CM

## 2023-09-19 DIAGNOSIS — E11.9 TYPE 2 DIABETES MELLITUS WITHOUT COMPLICATION, WITHOUT LONG-TERM CURRENT USE OF INSULIN (H): ICD-10-CM

## 2023-09-19 DIAGNOSIS — Z13.1 SCREENING FOR DIABETES MELLITUS: ICD-10-CM

## 2023-09-19 DIAGNOSIS — Z12.11 SCREEN FOR COLON CANCER: ICD-10-CM

## 2023-09-19 DIAGNOSIS — E66.812 CLASS 2 SEVERE OBESITY DUE TO EXCESS CALORIES WITH SERIOUS COMORBIDITY AND BODY MASS INDEX (BMI) OF 37.0 TO 37.9 IN ADULT (H): ICD-10-CM

## 2023-09-19 DIAGNOSIS — E66.01 CLASS 2 SEVERE OBESITY DUE TO EXCESS CALORIES WITH SERIOUS COMORBIDITY AND BODY MASS INDEX (BMI) OF 37.0 TO 37.9 IN ADULT (H): ICD-10-CM

## 2023-09-19 DIAGNOSIS — Z13.220 LIPID SCREENING: ICD-10-CM

## 2023-09-19 DIAGNOSIS — Z12.5 SCREENING PSA (PROSTATE SPECIFIC ANTIGEN): ICD-10-CM

## 2023-09-19 DIAGNOSIS — Z00.00 ROUTINE GENERAL MEDICAL EXAMINATION AT A HEALTH CARE FACILITY: Primary | ICD-10-CM

## 2023-09-19 LAB
ALBUMIN SERPL BCG-MCNC: 4.2 G/DL (ref 3.5–5.2)
ALP SERPL-CCNC: 80 U/L (ref 40–129)
ALT SERPL W P-5'-P-CCNC: 45 U/L (ref 0–70)
ANION GAP SERPL CALCULATED.3IONS-SCNC: 13 MMOL/L (ref 7–15)
AST SERPL W P-5'-P-CCNC: 27 U/L (ref 0–45)
BASOPHILS # BLD AUTO: 0 10E3/UL (ref 0–0.2)
BASOPHILS NFR BLD AUTO: 0 %
BILIRUB SERPL-MCNC: 0.6 MG/DL
BUN SERPL-MCNC: 12.4 MG/DL (ref 6–20)
CALCIUM SERPL-MCNC: 9.1 MG/DL (ref 8.6–10)
CHLORIDE SERPL-SCNC: 109 MMOL/L (ref 98–107)
CHOLEST SERPL-MCNC: 262 MG/DL
CREAT SERPL-MCNC: 0.76 MG/DL (ref 0.67–1.17)
DEPRECATED HCO3 PLAS-SCNC: 23 MMOL/L (ref 22–29)
EGFRCR SERPLBLD CKD-EPI 2021: >90 ML/MIN/1.73M2
EOSINOPHIL # BLD AUTO: 0.1 10E3/UL (ref 0–0.7)
EOSINOPHIL NFR BLD AUTO: 1 %
ERYTHROCYTE [DISTWIDTH] IN BLOOD BY AUTOMATED COUNT: 11.6 % (ref 10–15)
GLUCOSE SERPL-MCNC: 257 MG/DL (ref 70–99)
HBA1C MFR BLD: 11.6 % (ref 0–5.6)
HCT VFR BLD AUTO: 45.9 % (ref 40–53)
HDLC SERPL-MCNC: 33 MG/DL
HGB BLD-MCNC: 15.8 G/DL (ref 13.3–17.7)
IMM GRANULOCYTES # BLD: 0 10E3/UL
IMM GRANULOCYTES NFR BLD: 0 %
LDLC SERPL CALC-MCNC: 182 MG/DL
LYMPHOCYTES # BLD AUTO: 1.2 10E3/UL (ref 0.8–5.3)
LYMPHOCYTES NFR BLD AUTO: 20 %
MCH RBC QN AUTO: 31.7 PG (ref 26.5–33)
MCHC RBC AUTO-ENTMCNC: 34.4 G/DL (ref 31.5–36.5)
MCV RBC AUTO: 92 FL (ref 78–100)
MONOCYTES # BLD AUTO: 0.6 10E3/UL (ref 0–1.3)
MONOCYTES NFR BLD AUTO: 9 %
NEUTROPHILS # BLD AUTO: 4.4 10E3/UL (ref 1.6–8.3)
NEUTROPHILS NFR BLD AUTO: 69 %
NONHDLC SERPL-MCNC: 229 MG/DL
PLATELET # BLD AUTO: 176 10E3/UL (ref 150–450)
POTASSIUM SERPL-SCNC: 4.3 MMOL/L (ref 3.4–5.3)
PROT SERPL-MCNC: 6.6 G/DL (ref 6.4–8.3)
PSA SERPL DL<=0.01 NG/ML-MCNC: 2.95 NG/ML (ref 0–3.5)
RBC # BLD AUTO: 4.98 10E6/UL (ref 4.4–5.9)
SODIUM SERPL-SCNC: 145 MMOL/L (ref 136–145)
TRIGL SERPL-MCNC: 233 MG/DL
WBC # BLD AUTO: 6.4 10E3/UL (ref 4–11)

## 2023-09-19 PROCEDURE — G0103 PSA SCREENING: HCPCS | Performed by: PHYSICIAN ASSISTANT

## 2023-09-19 PROCEDURE — 99396 PREV VISIT EST AGE 40-64: CPT | Mod: 25 | Performed by: PHYSICIAN ASSISTANT

## 2023-09-19 PROCEDURE — 90471 IMMUNIZATION ADMIN: CPT | Performed by: PHYSICIAN ASSISTANT

## 2023-09-19 PROCEDURE — 99213 OFFICE O/P EST LOW 20 MIN: CPT | Mod: 25 | Performed by: PHYSICIAN ASSISTANT

## 2023-09-19 PROCEDURE — 83036 HEMOGLOBIN GLYCOSYLATED A1C: CPT | Performed by: PHYSICIAN ASSISTANT

## 2023-09-19 PROCEDURE — 80053 COMPREHEN METABOLIC PANEL: CPT | Performed by: PHYSICIAN ASSISTANT

## 2023-09-19 PROCEDURE — 84403 ASSAY OF TOTAL TESTOSTERONE: CPT | Performed by: PHYSICIAN ASSISTANT

## 2023-09-19 PROCEDURE — 90750 HZV VACC RECOMBINANT IM: CPT | Performed by: PHYSICIAN ASSISTANT

## 2023-09-19 PROCEDURE — 80061 LIPID PANEL: CPT | Performed by: PHYSICIAN ASSISTANT

## 2023-09-19 PROCEDURE — 85025 COMPLETE CBC W/AUTO DIFF WBC: CPT | Performed by: PHYSICIAN ASSISTANT

## 2023-09-19 PROCEDURE — 36415 COLL VENOUS BLD VENIPUNCTURE: CPT | Performed by: PHYSICIAN ASSISTANT

## 2023-09-19 RX ORDER — SILDENAFIL CITRATE 20 MG/1
TABLET ORAL
Qty: 30 TABLET | Refills: 2 | Status: SHIPPED | OUTPATIENT
Start: 2023-09-19 | End: 2024-03-09

## 2023-09-19 ASSESSMENT — ENCOUNTER SYMPTOMS
DIZZINESS: 0
DIARRHEA: 0
JOINT SWELLING: 0
ARTHRALGIAS: 0
WEAKNESS: 0
SORE THROAT: 0
FEVER: 0
PALPITATIONS: 0
HEADACHES: 0
EYE PAIN: 0
PARESTHESIAS: 0
DYSURIA: 0
COUGH: 0
HEMATOCHEZIA: 0
CHILLS: 0
ABDOMINAL PAIN: 0
MYALGIAS: 0
FREQUENCY: 0
NAUSEA: 0
CONSTIPATION: 0
HEMATURIA: 0
HEARTBURN: 0
NERVOUS/ANXIOUS: 0
SHORTNESS OF BREATH: 0

## 2023-09-19 ASSESSMENT — PAIN SCALES - GENERAL: PAINLEVEL: NO PAIN (0)

## 2023-09-19 NOTE — PROGRESS NOTES
SUBJECTIVE:   CC: Alberto is an 48 year old who presents for preventative health visit.     Healthy Habits:     Getting at least 3 servings of Calcium per day:  Yes    Bi-annual eye exam:  Yes    Dental care twice a year:  Yes    Sleep apnea or symptoms of sleep apnea:  None    Diet:  Regular (no restrictions)    Frequency of exercise:  1 day/week    Duration of exercise:  15-30 minutes    Taking medications regularly:  Yes    Medication side effects:  None    Additional concerns today:  Yes    History of left shoulder pain - Active release therapy     Current smoking no interested in resources     Mother with colon cancer.     No prostate cancer.     No alcohol use.     Caffeine 3-4 per day.       Social History     Tobacco Use    Smoking status: Every Day     Packs/day: 0.75     Years: 8.00     Pack years: 6.00     Types: Cigarettes     Passive exposure: Current    Smokeless tobacco: Never    Tobacco comments:     quit 2010   Substance Use Topics    Alcohol use: No           9/19/2023     7:23 AM   Alcohol Use   Prescreen: >3 drinks/day or >7 drinks/week? No     Last PSA: No results found for: PSA    Reviewed orders with patient. Reviewed health maintenance and updated orders accordingly - Yes    Reviewed and updated as needed this visit by clinical staff   Tobacco  Allergies  Meds              Reviewed and updated as needed this visit by Provider                 Past Medical History:   Diagnosis Date    Overactive bladder       Past Surgical History:   Procedure Laterality Date    APPENDECTOMY  10/9/2008       Review of Systems   Constitutional:  Negative for chills and fever.   HENT:  Negative for congestion, ear pain, hearing loss and sore throat.    Eyes:  Negative for pain and visual disturbance.   Respiratory:  Negative for cough and shortness of breath.    Cardiovascular:  Negative for chest pain, palpitations and peripheral edema.   Gastrointestinal:  Negative for abdominal pain, constipation, diarrhea,  "heartburn, hematochezia and nausea.   Genitourinary:  Positive for impotence. Negative for dysuria, frequency, genital sores, hematuria, penile discharge and urgency.   Musculoskeletal:  Negative for arthralgias, joint swelling and myalgias.   Skin:  Negative for rash.   Neurological:  Negative for dizziness, weakness, headaches and paresthesias.   Psychiatric/Behavioral:  Negative for mood changes. The patient is not nervous/anxious.          OBJECTIVE:   /73   Pulse 71   Temp 97.4  F (36.3  C) (Tympanic)   Resp 16   Ht 1.702 m (5' 7\")   Wt 107.5 kg (237 lb)   SpO2 98%   BMI 37.12 kg/m      Physical Exam  GENERAL: alert, no distress, and over weight  EYES: Eyes grossly normal to inspection, PERRL and conjunctivae and sclerae normal  HENT: ear canals and TM's normal, nose and mouth without ulcers or lesions  NECK: no adenopathy, no asymmetry, masses, or scars and thyroid normal to palpation  RESP: lungs clear to auscultation - no rales, rhonchi or wheezes  CV: regular rate and rhythm, normal S1 S2, no S3 or S4, no murmur, click or rub, no peripheral edema and peripheral pulses strong  ABDOMEN: soft, nontender, no hepatosplenomegaly, no masses and bowel sounds normal   (male): normal male genitalia without lesions or urethral discharge, no hernia  MS: no gross musculoskeletal defects noted, no edema  SKIN: no suspicious lesions or rashes  NEURO: Normal strength and tone, mentation intact and speech normal  PSYCH: mentation appears normal, affect normal/bright    ASSESSMENT/PLAN:   (Z00.00) Routine general medical examination at a health care facility  (primary encounter diagnosis)  Comment:   Plan: Comprehensive metabolic panel (BMP + Alb, Alk         Phos, ALT, AST, Total. Bili, TP), CBC with         platelets and differential            (Z12.11) Screen for colon cancer  Comment:   Plan: Colonoscopy Screening  Referral            (E66.01,  Z68.37) Class 2 severe obesity due to excess calories " "with serious comorbidity and body mass index (BMI) of 37.0 to 37.9 in adult (H)  Comment:   Plan: Discussed lifestyle modifications.    (N52.9) Erectile dysfunction, unspecified erectile dysfunction type  Comment: History of erectile dysfunction.  Patient was interested in checking testosterone to see if this may be contributory.  Plan: sildenafil (REVATIO) 20 MG tablet,         Testosterone, total        Lipid screening.  (Z13.220) Lipid screening  Comment:   Plan: Lipid panel reflex to direct LDL Fasting          (Z12.5) Screening PSA (prostate specific antigen)  Comment: PSA screen  Plan: PSA, screen          Patient has been advised of split billing requirements and indicates understanding: Yes      COUNSELING:   Reviewed preventive health counseling, as reflected in patient instructions       Regular exercise       Healthy diet/nutrition       Colorectal cancer screening       Prostate cancer screening      BMI:   Estimated body mass index is 37.12 kg/m  as calculated from the following:    Height as of this encounter: 1.702 m (5' 7\").    Weight as of this encounter: 107.5 kg (237 lb).   Weight management plan: Discussed healthy diet and exercise guidelines      He reports that he has been smoking cigarettes. He has a 6.00 pack-year smoking history. He has been exposed to tobacco smoke. He has never used smokeless tobacco.  Nicotine/Tobacco Cessation Plan:   Information offered: Patient not interested at this time            Owen Rubio PA-C  [facility] [place]OVERAnswers submitted by the patient for this visit:  Annual Preventive Visit (Submitted on 9/19/2023)  Chief Complaint: Annual Exam:  Frequency of exercise:: 1 day/week  Getting at least 3 servings of Calcium per day:: Yes  Diet:: Regular (no restrictions)  Taking medications regularly:: Yes  Medication side effects:: None  Bi-annual eye exam:: Yes  Dental care twice a year:: Yes  Sleep apnea or symptoms of sleep apnea:: None  abdominal " pain: No  Blood in stool: No  Blood in urine: No  chest pain: No  chills: No  congestion: No  constipation: No  cough: No  diarrhea: No  dizziness: No  ear pain: No  eye pain: No  nervous/anxious: No  fever: No  frequency: No  genital sores: No  headaches: No  hearing loss: No  heartburn: No  arthralgias: No  joint swelling: No  peripheral edema: No  mood changes: No  myalgias: No  nausea: No  dysuria: No  palpitations: No  Skin sensation changes: No  sore throat: No  urgency: No  rash: No  shortness of breath: No  visual disturbance: No  weakness: No  impotence: Yes  penile discharge: No  Additional concerns today:: Yes  Exercise outside of work (Submitted on 9/19/2023)  Chief Complaint: Annual Exam:  Duration of exercise:: 15-30 minutes

## 2023-09-19 NOTE — PATIENT INSTRUCTIONS
2-6 months call schedule for second shingrix           Preventive Health Recommendations  Male Ages 50 - 64    Yearly exam:             See your health care provider every year in order to  o   Review health changes.   o   Discuss preventive care.    o   Review your medicines if your doctor has prescribed any.   Have a cholesterol test every 5 years, or more frequently if you are at risk for high cholesterol/heart disease.   Have a diabetes test (fasting glucose) every three years. If you are at risk for diabetes, you should have this test more often.   Have a colonoscopy at age 50, or have a yearly FIT test (stool test). These exams will check for colon cancer.    Talk with your health care provider about whether or not a prostate cancer screening test (PSA) is right for you.  You should be tested each year for STDs (sexually transmitted diseases), if you re at risk.     Shots: Get a flu shot each year. Get a tetanus shot every 10 years.     Nutrition:  Eat at least 5 servings of fruits and vegetables daily.   Eat whole-grain bread, whole-wheat pasta and brown rice instead of white grains and rice.   Get adequate Calcium and Vitamin D.     Lifestyle  Exercise for at least 150 minutes a week (30 minutes a day, 5 days a week). This will help you control your weight and prevent disease.   Limit alcohol to one drink per day.   No smoking.   Wear sunscreen to prevent skin cancer.   See your dentist every six months for an exam and cleaning.   See your eye doctor every 1 to 2 years.

## 2023-09-20 PROBLEM — E11.9 DIABETES MELLITUS, TYPE 2 (H): Status: ACTIVE | Noted: 2023-09-20

## 2023-09-20 RX ORDER — ATORVASTATIN CALCIUM 20 MG/1
20 TABLET, FILM COATED ORAL DAILY
Qty: 90 TABLET | Refills: 0 | Status: SHIPPED | OUTPATIENT
Start: 2023-09-20 | End: 2024-06-16

## 2023-09-20 RX ORDER — METFORMIN HCL 500 MG
TABLET, EXTENDED RELEASE 24 HR ORAL
Qty: 90 TABLET | Refills: 0 | Status: SHIPPED | OUTPATIENT
Start: 2023-09-20 | End: 2023-10-14

## 2023-09-20 RX ORDER — LANCETS
EACH MISCELLANEOUS
Qty: 100 EACH | Refills: 6 | Status: SHIPPED | OUTPATIENT
Start: 2023-09-20

## 2023-09-20 NOTE — RESULT ENCOUNTER NOTE
Mr. Fuentes,     It was nice to speak with you on the phone today.  As discussed on the phone that hemoglobin A1c is elevated and is consistent with diabetes.  I do recommend starting metformin medication.  Further, recommendations are to start cholesterol medication with diabetes 1 LDL cholesterol is elevated at current value.    Sent a statin medication as well as metformin to pharmacy.  Placed referral for diabetic educator.    The 10-year ASCVD risk score (Ben BOBBY, et al., 2019) is: 23.7%    Values used to calculate the score:      Age: 48 years      Sex: Male      Is Non- : No      Diabetic: Yes      Tobacco smoker: Yes      Systolic Blood Pressure: 110 mmHg      Is BP treated: No      HDL Cholesterol: 33 mg/dL      Total Cholesterol: 262 mg/dL      Your kidney function (creatinine) as well as liver function studies are normal.      Normal prostate antigen screen.    Normal hemoglobin without evidence of anemia.      Please follow-up in 1 month in clinic for recheck of symptoms as well as medications    If any further questions or concerns please reach out.      Sincerely,    Owen Rubio PA-C

## 2023-09-21 LAB — TESTOST SERPL-MCNC: 446 NG/DL (ref 240–950)

## 2023-10-01 ENCOUNTER — TRANSFERRED RECORDS (OUTPATIENT)
Dept: MULTI SPECIALTY CLINIC | Facility: CLINIC | Age: 48
End: 2023-10-01

## 2023-10-01 LAB — RETINOPATHY: NORMAL

## 2023-10-03 ENCOUNTER — ALLIED HEALTH/NURSE VISIT (OUTPATIENT)
Dept: EDUCATION SERVICES | Facility: CLINIC | Age: 48
End: 2023-10-03
Attending: PHYSICIAN ASSISTANT
Payer: COMMERCIAL

## 2023-10-03 DIAGNOSIS — E11.9 TYPE 2 DIABETES MELLITUS WITHOUT COMPLICATION, WITHOUT LONG-TERM CURRENT USE OF INSULIN (H): ICD-10-CM

## 2023-10-03 PROCEDURE — G0108 DIAB MANAGE TRN  PER INDIV: HCPCS | Mod: AE

## 2023-10-03 RX ORDER — BLOOD-GLUCOSE SENSOR
1 EACH MISCELLANEOUS CONTINUOUS
Qty: 1 EACH | Refills: 0 | Status: SHIPPED | OUTPATIENT
Start: 2023-10-03

## 2023-10-03 RX ORDER — BLOOD-GLUCOSE SENSOR
1 EACH MISCELLANEOUS CONTINUOUS
Qty: 6 EACH | Refills: 11 | Status: SHIPPED | OUTPATIENT
Start: 2023-10-03

## 2023-10-03 NOTE — PATIENT INSTRUCTIONS
Diabetes Support Resources:  Metformin  mg our goal is to have 2 pills with breakfast and 2 pills with dinner.    Follow the meal plan of having at least 200 gms of carbs each day.    Proteins are very important .  They are needed for each meal and snacks  Exercise as you feel comfortable.  Be sure your BG < 250  Fasting BG and before meals are  , 2 hrs after first bite of food <180     Bring blood glucose meter and logbook with you to all doctor and follow-up appointments.    Diabetes Education Telephone Visit Follow-up:    We realize your time is valuable and your health is important! We offer a convenient Telephone Visit follow up! It s a quick way to check in for a medication dose adjustment without having to come back to clinic as soon.    Telephone Visits are often covered by insurance. Please check with your insurance plan to see if this type of visit is covered. If not, the cost is less expensive than an office visit:    Up to 10 minutes (Code 77894): $30  11-20 minutes (Code 25821): $59  More than 20 minutes (Code 07983): $85    Talk with your Diabetes Educator if you want to learn more.      [facility] Diabetes Education and Nutrition Services:  For Your Diabetes Education and Nutrition Appointments Call:  [phone]   For Diabetes Education or Nutrition Related Questions:   Phone: [phone]  Send [employer] Message   If you need a medication refill please contact your pharmacy. Please allow 3 business days for your refills to be completed.

## 2023-10-03 NOTE — LETTER
10/3/2023         RE: Alberto Fuentes  [address] [place] [address]        Dear Colleague,    Thank you for referring your patient, Alberto Fuentes, to the [facility]. Please see a copy of my visit note below.    Diabetes Self-Management Education & Support    Presents for: Initial Assessment for new diagnosis    Type of Service: In Person Visit    Assessment Type:   Today Alberto comes to learn about diabetes since newly DX.  He thought if he avoided carbs he would do well, lose weight and exercise.  He was trying to deny the DX (which is very normal) and wondering why so high BG all of a sudden.  At first was justifying what he was doing , then after the education began, he relaxed and did learn so much and was so grateful for the information.  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  Had many great questions/concerns. One of his concerns was he cannot get in to see his PCP when he calls for an appointment.  We did discuss this issue as well.  He is now wearing a sensor to help him learn about his BG and his foods.  He is so willing to follow up and learn more of his diabetes.    Patient's most recent   Lab Results   Component Value Date    A1C 11.6 09/19/2023     is not meeting goal of <7.0    Diabetes knowledge and skills assessment:   Patient is knowledgeable in diabetes management concepts related to: Taking Medication    Continue education with the following diabetes management concepts: Healthy Eating, Being Active, Monitoring, Taking Medication, Problem Solving, Reducing Risks, and Healthy Coping    Based on learning assessment above, most appropriate setting for further diabetes education would be: Individual setting.      PLAN    Metformin  mg our goal is to have 2 pills with breakfast and 2 pills with dinner.    Follow the meal plan of having at least 200 gms of carbs each day.    Proteins are very important .  They are needed for  "each meal and snacks  Exercise as you feel comfortable.  Be sure your BG < 250  Fasting BG and before meals are  , 2 hrs after first bite of food <180  Topics to cover at upcoming visits: Healthy Eating, Being Active, Monitoring, Taking Medication, Problem Solving, Reducing Risks, and Healthy Coping    Follow-up:     See Care Plan for co-developed, patient-state behavior change goals.  AVS provided for patient today.    Education Materials Provided:   [facility] Understanding Diabetes Booklet, Carbohydrate Counting, Medication Information on metformin, and My Plate Planner      SUBJECTIVE/OBJECTIVE:  Presents for: Initial Assessment for new diagnosis  Accompanied by: Self  Diabetes education in the past 24mo: No  Focus of Visit: Diabetes Pathophysiology, Being Active, Assistance w/ making life changes, Healthy Eating, Taking Medication  Diabetes type: Type 2  How confident are you filling out medical forms by yourself:: Extremely  Transportation concerns: No  Difficulty affording diabetes medication?: No  Difficulty affording diabetes testing supplies?: No  Other concerns:: None  Cultural Influences/Ethnic Background:  Choose not to answer      Diabetes Symptoms & Complications:  Fatigue: Yes  Neuropathy: Sometimes  Polydipsia: Sometimes  Polyphagia: No  Polyuria: Sometimes  Visual change: Sometimes  Slow healing wounds: Sometimes  Weight trend: Decreasing  Autonomic neuropathy: No  CVA: No  Heart disease: No  Nephropathy: No  Peripheral neuropathy: No  Peripheral Vascular Disease: No  Retinopathy: No  Sexual dysfunction: Other    Patient Problem List and Family Medical History reviewed for relevant medical history, current medical status, and diabetes risk factors.    Vitals:  There were no vitals taken for this visit.  Estimated body mass index is 37.12 kg/m  as calculated from the following:    Height as of 9/19/23: 1.702 m (5' 7\").    Weight as of 9/19/23: 107.5 kg (237 lb).   Last 3 BP:   BP " Readings from Last 3 Encounters:   09/19/23 110/73   05/18/23 (!) 166/95   05/18/23 130/82       History   Smoking Status     Every Day     Packs/day: 0.75     Years: 8.00     Types: Cigarettes   Smokeless Tobacco     Never       Labs:  Lab Results   Component Value Date    A1C 11.6 09/19/2023     Lab Results   Component Value Date     09/19/2023     11/02/2021     04/05/2018     Lab Results   Component Value Date     09/19/2023     04/05/2018     HDL Cholesterol   Date Value Ref Range Status   04/05/2018 44 >39 mg/dL Final     Direct Measure HDL   Date Value Ref Range Status   09/19/2023 33 (L) >=40 mg/dL Final   ]  GFR Estimate   Date Value Ref Range Status   09/19/2023 >90 >60 mL/min/1.73m2 Final     No results found for: GFRESTBLACK  Lab Results   Component Value Date    CR 0.76 09/19/2023     No results found for: MICROALBUMIN    Healthy Eating:  Healthy Eating Assessed Today: Yes  Cultural/Mandaen diet restrictions?: No  Meal planning/habits: Avoiding sweets, Calorie counting, Carb counting, Low carb, Heart healthy, Low salt, Smaller portions, Keeps food records  How many times a week on average do you eat food made away from home (restaurant/take-out)?: 1  Meals include: Breakfast, Dinner  Breakfast: 0415, eats greek yogurt, zero sugar.  at 0700 colleen byrd, was having yogurt and granola  Lunch: some days may skip or eat at 1500, if eating lunch sandwich with chx and veggies  Dinner: 4270-4111, meat and veggies, (brussel sprouts and brochile, or tomatoes with oils and vinargaret  Snacks: was having cucs and veggies and nuts  Other: avacado and salad. some salmon  Beverages: Water, Tea, Coffee  Has patient met with a dietitian in the past?: No    Being Active:  Being Active Assessed Today: Yes  Exercise:: Yes  Barrier to exercise: Time, Physical limitation    Monitoring:  Times checking blood sugar at home (number): 1  Times checking blood sugar at home (per): Day  Blood  glucose trend: No change          Taking Medications:  Diabetes Medication(s)       Biguanides       metFORMIN (GLUCOPHAGE XR) 500 MG 24 hr tablet    Take 1 tablet (500 mg) in the evening for 7 days, next take 1 tablet (500 mg) in the morning and 1 tablet (500 mg) in the evening for 7 days, then take 1 tablet (500 mg) in the morning and 2 tablets (1000 mg) in the evening for 7 days, then take 2 tablets (1000 mg) in the morning and 2 tablets (1000 mg) in the evening for 7 days.            Current Treatments: Oral Medication (taken by mouth)    Problem Solving:                 Reducing Risks:  CAD Risks: Diabetes Mellitus, Dyslipidemia, Obesity, Tobacco exposure  Has dilated eye exam at least once a year?: Yes  Sees dentist every 6 months?: Yes  Feet checked by healthcare provider in the last year?: No    Healthy Coping:  Informal Support system:: Children, Family, Friends, Spouse  Patient Activation Measure Survey Score:      9/6/2011     7:00 AM 4/14/2023    12:00 PM   LASHONDA Score (Last Two)   LASHONDA Raw Score 39 40   Activation Score 56.4 100   LASHONDA Level 3 4         Care Plan and Education Provided:  Care Plan: Diabetes   Updates made by eGnny Townsend RN since 10/4/2023 12:00 AM        Problem: HbA1C Not In Goal         Goal: Establish Regular Follow-Ups with PCP         Task: Discuss with PCP the recommended timing for patient's next follow up visit(s) Completed 10/4/2023   Responsible User: Genny Townsend RN        Task: Discuss schedule for PCP visits with patient Completed 10/4/2023   Responsible User: Genny Townsend RN        Goal: Get HbA1C Level in Goal    Start Date: 10/4/2023   This Visit's Progress: 0%        Task: Educate patient on diabetes education self-management topics Completed 10/4/2023   Responsible User: Genny Townsend RN        Task: Educate patient on benefits of regular glucose monitoring Completed 10/4/2023   Responsible User: Genny Townsend RN        Task: Refer patient to appropriate  extended care team member, as needed (Medication Therapy Management, Behavioral Health, Physical Therapy, etc.)    Responsible User: Genny Townsend RN        Task: Discuss diabetes treatment plan with patient Completed 10/4/2023   Responsible User: Genny Townsend RN        Problem: Diabetes Self-Management Education Needed to Optimize Self-Care Behaviors         Goal: Understand diabetes pathophysiology and disease progression         Task: Provide education on diabetes pathophysiology and disease progression specfic to patient's diabetes type Completed 10/4/2023   Responsible User: Genny Townsend RN        Goal: Healthy Eating - follow a healthy eating pattern for diabetes    Start Date: 10/4/2023   This Visit's Progress: 30%        Task: Provide education on portion control and consistency in amount, composition and timing of food intake Completed 10/4/2023   Responsible User: Genny Townsend RN        Task: Provide education on managing carbohydrate intake (carbohydrate counting, plate planning method, etc.) Completed 10/4/2023   Responsible User: Genny Townsend RN        Task: Provide education on weight management    Responsible User: Genny Townsend RN        Task: Provide education on heart healthy eating    Responsible User: Genny Townsend RN        Task: Provide education on eating out    Responsible User: Genny Townsend RN        Task: Develop individualized healthy eating plan with patient Completed 10/4/2023   Responsible User: Genny Townsend RN        Goal: Being Active - get regular physical activity, working up to at least 150 minutes per week    Start Date: 10/4/2023   This Visit's Progress: 20%        Task: Provide education on relationship of activity to glucose and precautions to take if at risk for low glucose Completed 10/4/2023   Responsible User: Genny Townsend RN        Task: Discuss barriers to physical activity with patient Completed 10/4/2023   Responsible User: Genny Townsend RN         Task: Develop physical activity plan with patient Completed 10/4/2023   Responsible User: Genny Townsend RN        Task: Explore community resources including walking groups, assistance programs, and home videos    Responsible User: Genny Townsend RN        Goal: Monitoring - monitor glucose and ketones as directed    Start Date: 10/4/2023   This Visit's Progress: 10%        Task: Provide education on blood glucose monitoring (purpose, proper technique, frequency, glucose targets, interpreting results, when to use glucose control solution, sharps disposal) Completed 10/4/2023   Responsible User: Genny Townsend RN        Task: Provide education on continuous glucose monitoring (sensor placement, use of [place] or /reader, understanding glucose trends, alerts and alarms, differences between sensor glucose and blood glucose) Completed 10/4/2023   Responsible User: Genny Townsend RN        Task: Provide education on ketone monitoring (when to monitor, frequency, etc.)    Responsible User: Genny Townsend RN        Goal: Taking Medication - patient is consistently taking medications as directed    Start Date: 10/4/2023   This Visit's Progress: 20%        Task: Provide education on action of prescribed medication, including when to take and possible side effects Completed 10/4/2023   Responsible User: Genny Townsend RN        Task: Provide education on insulin and injectable diabetes medications, including administration, storage, site selection and rotation for injection sites Completed 10/4/2023   Responsible User: Genny Townsend RN        Task: Discuss barriers to medication adherence with patient and provide management technique ideas as appropriate Completed 10/4/2023   Responsible User: Genny Townsend RN        Task: Provide education on frequency and refill details of medications Completed 10/4/2023   Responsible User: Genny Townsend RN        Goal: Problem Solving - know how to prevent and  manage short-term diabetes complications         Task: Provide education on high blood glucose - causes, signs/symptoms, prevention and treatment Completed 10/4/2023   Responsible User: Genny Townsend RN        Task: Provide education on low blood glucose - causes, signs/symptoms, prevention, treatment, carrying a carbohydrate source at all times, and medical identification Completed 10/4/2023   Responsible User: Genny Townsend RN        Task: Provide education on safe travel with diabetes Completed 10/4/2023   Responsible User: Genny Townsend RN        Task: Provide education on how to care for diabetes on sick days Completed 10/4/2023   Responsible User: Genny Townsend RN        Task: Provide education on when to call a health care provider Completed 10/4/2023   Responsible User: Genny Townsend RN        Goal: Reducing Risks - know how to prevent and treat long-term diabetes complications         Task: Provide education on major complications of diabetes, prevention, early diagnostic measures and treatment of complications    Responsible User: Genny Townsend RN        Task: Provide education on recommended care for dental, eye and foot health    Responsible User: Genny Townsend RN        Task: Provide education on Hemoglobin A1c - goals and relationship to blood glucose levels    Responsible User: Genny Townsend RN        Task: Provide education on recommendations for heart health - lipid levels and goals, blood pressure and goals, and aspirin therapy, if indicated    Responsible User: Genny Townsend RN        Task: Provide education on tobacco cessation    Responsible User: Genny Townsend RN        Goal: Healthy Coping - use available resources to cope with the challenges of managing diabetes    Start Date: 10/4/2023   This Visit's Progress: 20%        Task: Discuss recognizing feelings about having diabetes Completed 10/4/2023   Responsible User: Genny Townsend RN        Task: Provide education on the  benefits of making appropriate lifestyle changes Completed 10/4/2023   Responsible User: Genny Townsend, LILLIANA        Task: Provide education on benefits of utilizing support systems Completed 10/4/2023   Responsible User: Genny Townsend RN        Task: Discuss methods for coping with stress    Responsible User: Genny Townsend RN        Task: Provide education on when to seek professional counseling    Responsible User: Genny Townsend, RN          Radha Townsend RN/MARIANO Chowdary Diabetes Educator      Time Spent: 90 minutes  Encounter Type: Individual    Any diabetes medication dose changes were made via the CDE Protocol per the patient's primary care provider. A copy of this encounter was shared with the provider.

## 2023-10-04 NOTE — PROGRESS NOTES
Diabetes Self-Management Education & Support    Presents for: Initial Assessment for new diagnosis    Type of Service: In Person Visit    Assessment Type:   Today Alberto comes to learn about diabetes since newly DX.  He thought if he avoided carbs he would do well, lose weight and exercise.  He was trying to deny the DX (which is very normal) and wondering why so high BG all of a sudden.  At first was justifying what he was doing , then after the education began, he relaxed and did learn so much and was so grateful for the information.  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  Had many great questions/concerns. One of his concerns was he cannot get in to see his PCP when he calls for an appointment.  We did discuss this issue as well.  He is now wearing a sensor to help him learn about his BG and his foods.  He is so willing to follow up and learn more of his diabetes.    Patient's most recent   Lab Results   Component Value Date    A1C 11.6 09/19/2023     is not meeting goal of <7.0    Diabetes knowledge and skills assessment:   Patient is knowledgeable in diabetes management concepts related to: Taking Medication    Continue education with the following diabetes management concepts: Healthy Eating, Being Active, Monitoring, Taking Medication, Problem Solving, Reducing Risks, and Healthy Coping    Based on learning assessment above, most appropriate setting for further diabetes education would be: Individual setting.      PLAN    Metformin  mg our goal is to have 2 pills with breakfast and 2 pills with dinner.    Follow the meal plan of having at least 200 gms of carbs each day.    Proteins are very important .  They are needed for each meal and snacks  Exercise as you feel comfortable.  Be sure your BG < 250  Fasting BG and before meals are  , 2 hrs after first bite of food <180  Topics to cover at upcoming visits: Healthy Eating, Being Active, Monitoring, Taking Medication,  "Problem Solving, Reducing Risks, and Healthy Coping    Follow-up:     See Care Plan for co-developed, patient-state behavior change goals.  AVS provided for patient today.    Education Materials Provided:  [employer] Epi Understanding Diabetes Booklet, Carbohydrate Counting, Medication Information on metformin, and My Plate Planner      SUBJECTIVE/OBJECTIVE:  Presents for: Initial Assessment for new diagnosis  Accompanied by: Self  Diabetes education in the past 24mo: No  Focus of Visit: Diabetes Pathophysiology, Being Active, Assistance w/ making life changes, Healthy Eating, Taking Medication  Diabetes type: Type 2  How confident are you filling out medical forms by yourself:: Extremely  Transportation concerns: No  Difficulty affording diabetes medication?: No  Difficulty affording diabetes testing supplies?: No  Other concerns:: None  Cultural Influences/Ethnic Background:  Choose not to answer      Diabetes Symptoms & Complications:  Fatigue: Yes  Neuropathy: Sometimes  Polydipsia: Sometimes  Polyphagia: No  Polyuria: Sometimes  Visual change: Sometimes  Slow healing wounds: Sometimes  Weight trend: Decreasing  Autonomic neuropathy: No  CVA: No  Heart disease: No  Nephropathy: No  Peripheral neuropathy: No  Peripheral Vascular Disease: No  Retinopathy: No  Sexual dysfunction: Other    Patient Problem List and Family Medical History reviewed for relevant medical history, current medical status, and diabetes risk factors.    Vitals:  There were no vitals taken for this visit.  Estimated body mass index is 37.12 kg/m  as calculated from the following:    Height as of 9/19/23: 1.702 m (5' 7\").    Weight as of 9/19/23: 107.5 kg (237 lb).   Last 3 BP:   BP Readings from Last 3 Encounters:   09/19/23 110/73   05/18/23 (!) 166/95   05/18/23 130/82       History   Smoking Status    Every Day    Packs/day: 0.75    Years: 8.00    Types: Cigarettes   Smokeless Tobacco    Never       Labs:  Lab Results   Component Value " Date    A1C 11.6 09/19/2023     Lab Results   Component Value Date     09/19/2023     11/02/2021     04/05/2018     Lab Results   Component Value Date     09/19/2023     04/05/2018     HDL Cholesterol   Date Value Ref Range Status   04/05/2018 44 >39 mg/dL Final     Direct Measure HDL   Date Value Ref Range Status   09/19/2023 33 (L) >=40 mg/dL Final   ]  GFR Estimate   Date Value Ref Range Status   09/19/2023 >90 >60 mL/min/1.73m2 Final     No results found for: GFRESTBLACK  Lab Results   Component Value Date    CR 0.76 09/19/2023     No results found for: MICROALBUMIN    Healthy Eating:  Healthy Eating Assessed Today: Yes  Cultural/Mormonism diet restrictions?: No  Meal planning/habits: Avoiding sweets, Calorie counting, Carb counting, Low carb, Heart healthy, Low salt, Smaller portions, Keeps food records  How many times a week on average do you eat food made away from home (restaurant/take-out)?: 1  Meals include: Breakfast, Dinner  Breakfast: 0415, eats greek yogurt, zero sugar.  at 0700 veggie renelet, was having yogurt and granola  Lunch: some days may skip or eat at 1500, if eating lunch sandwich with chx and veggies  Dinner: [id number], meat and veggies, (brussel sprouts and brochile, or tomatoes with oils and vinargaret  Snacks: was having cucs and veggies and nuts  Other: avacado and salad. some salmon  Beverages: Water, Tea, Coffee  Has patient met with a dietitian in the past?: No    Being Active:  Being Active Assessed Today: Yes  Exercise:: Yes  Barrier to exercise: Time, Physical limitation    Monitoring:  Times checking blood sugar at home (number): 1  Times checking blood sugar at home (per): Day  Blood glucose trend: No change          Taking Medications:  Diabetes Medication(s)       Biguanides       metFORMIN (GLUCOPHAGE XR) 500 MG 24 hr tablet    Take 1 tablet (500 mg) in the evening for 7 days, next take 1 tablet (500 mg) in the morning and 1 tablet (500 mg) in  the evening for 7 days, then take 1 tablet (500 mg) in the morning and 2 tablets (1000 mg) in the evening for 7 days, then take 2 tablets (1000 mg) in the morning and 2 tablets (1000 mg) in the evening for 7 days.            Current Treatments: Oral Medication (taken by mouth)    Problem Solving:                 Reducing Risks:  CAD Risks: Diabetes Mellitus, Dyslipidemia, Obesity, Tobacco exposure  Has dilated eye exam at least once a year?: Yes  Sees dentist every 6 months?: Yes  Feet checked by healthcare provider in the last year?: No    Healthy Coping:  Informal Support system:: Children, Family, Friends, Spouse  Patient Activation Measure Survey Score:      9/6/2011     7:00 AM 4/14/2023    12:00 PM   LASHONDA Score (Last Two)   LASHONDA Raw Score 39 40   Activation Score 56.4 100   LASHONDA Level 3 4         Care Plan and Education Provided:  Care Plan: Diabetes   Updates made by Genny Townsend RN since 10/4/2023 12:00 AM        Problem: HbA1C Not In Goal         Goal: Establish Regular Follow-Ups with PCP         Task: Discuss with PCP the recommended timing for patient's next follow up visit(s) Completed 10/4/2023   Responsible User: Genny Townsend RN        Task: Discuss schedule for PCP visits with patient Completed 10/4/2023   Responsible User: Genny Townsend RN        Goal: Get HbA1C Level in Goal    Start Date: 10/4/2023   This Visit's Progress: 0%        Task: Educate patient on diabetes education self-management topics Completed 10/4/2023   Responsible User: Genny Townsend RN        Task: Educate patient on benefits of regular glucose monitoring Completed 10/4/2023   Responsible User: Genny Townsend RN        Task: Refer patient to appropriate extended care team member, as needed (Medication Therapy Management, Behavioral Health, Physical Therapy, etc.)    Responsible User: Genny Townsend RN        Task: Discuss diabetes treatment plan with patient Completed 10/4/2023   Responsible User: Genny Townsend RN         Problem: Diabetes Self-Management Education Needed to Optimize Self-Care Behaviors         Goal: Understand diabetes pathophysiology and disease progression         Task: Provide education on diabetes pathophysiology and disease progression specfic to patient's diabetes type Completed 10/4/2023   Responsible User: Genny Townsend RN        Goal: Healthy Eating - follow a healthy eating pattern for diabetes    Start Date: 10/4/2023   This Visit's Progress: 30%        Task: Provide education on portion control and consistency in amount, composition and timing of food intake Completed 10/4/2023   Responsible User: Genny Townsend RN        Task: Provide education on managing carbohydrate intake (carbohydrate counting, plate planning method, etc.) Completed 10/4/2023   Responsible User: Genny Townsend RN        Task: Provide education on weight management    Responsible User: Genny Townsend RN        Task: Provide education on heart healthy eating    Responsible User: Genny Townsend RN        Task: Provide education on eating out    Responsible User: Genny Townsend RN        Task: Develop individualized healthy eating plan with patient Completed 10/4/2023   Responsible User: Genny Townsend RN        Goal: Being Active - get regular physical activity, working up to at least 150 minutes per week    Start Date: 10/4/2023   This Visit's Progress: 20%        Task: Provide education on relationship of activity to glucose and precautions to take if at risk for low glucose Completed 10/4/2023   Responsible User: Genny Townsend RN        Task: Discuss barriers to physical activity with patient Completed 10/4/2023   Responsible User: Genny Townsend RN        Task: Develop physical activity plan with patient Completed 10/4/2023   Responsible User: Genny Townsend RN        Task: Explore community resources including walking groups, assistance programs, and home videos    Responsible User: Genny Townsend RN        Goal:  Monitoring - monitor glucose and ketones as directed    Start Date: 10/4/2023   This Visit's Progress: 10%        Task: Provide education on blood glucose monitoring (purpose, proper technique, frequency, glucose targets, interpreting results, when to use glucose control solution, sharps disposal) Completed 10/4/2023   Responsible User: Genny Townsend RN        Task: Provide education on continuous glucose monitoring (sensor placement, use of austin or /reader, understanding glucose trends, alerts and alarms, differences between sensor glucose and blood glucose) Completed 10/4/2023   Responsible User: Genny Townsend RN        Task: Provide education on ketone monitoring (when to monitor, frequency, etc.)    Responsible User: Genny Townsend RN        Goal: Taking Medication - patient is consistently taking medications as directed    Start Date: 10/4/2023   This Visit's Progress: 20%        Task: Provide education on action of prescribed medication, including when to take and possible side effects Completed 10/4/2023   Responsible User: Genny Townsend RN        Task: Provide education on insulin and injectable diabetes medications, including administration, storage, site selection and rotation for injection sites Completed 10/4/2023   Responsible User: Genny Townsend RN        Task: Discuss barriers to medication adherence with patient and provide management technique ideas as appropriate Completed 10/4/2023   Responsible User: Genny Townsend RN        Task: Provide education on frequency and refill details of medications Completed 10/4/2023   Responsible User: Genny Townsend RN        Goal: Problem Solving - know how to prevent and manage short-term diabetes complications         Task: Provide education on high blood glucose - causes, signs/symptoms, prevention and treatment Completed 10/4/2023   Responsible User: Genny Townsend RN        Task: Provide education on low blood glucose - causes,  signs/symptoms, prevention, treatment, carrying a carbohydrate source at all times, and medical identification Completed 10/4/2023   Responsible User: Genny Townsend RN        Task: Provide education on safe travel with diabetes Completed 10/4/2023   Responsible User: Genny Townsend RN        Task: Provide education on how to care for diabetes on sick days Completed 10/4/2023   Responsible User: Genny Townsend RN        Task: Provide education on when to call a health care provider Completed 10/4/2023   Responsible User: Genny Townsend RN        Goal: Reducing Risks - know how to prevent and treat long-term diabetes complications         Task: Provide education on major complications of diabetes, prevention, early diagnostic measures and treatment of complications    Responsible User: Genny Townsend RN        Task: Provide education on recommended care for dental, eye and foot health    Responsible User: Genny Townsend RN        Task: Provide education on Hemoglobin A1c - goals and relationship to blood glucose levels    Responsible User: Genny Townsend RN        Task: Provide education on recommendations for heart health - lipid levels and goals, blood pressure and goals, and aspirin therapy, if indicated    Responsible User: Genny Townsend RN        Task: Provide education on tobacco cessation    Responsible User: Genny Townsend RN        Goal: Healthy Coping - use available resources to cope with the challenges of managing diabetes    Start Date: 10/4/2023   This Visit's Progress: 20%        Task: Discuss recognizing feelings about having diabetes Completed 10/4/2023   Responsible User: Genny Townsend RN        Task: Provide education on the benefits of making appropriate lifestyle changes Completed 10/4/2023   Responsible User: Genny Townsend RN        Task: Provide education on benefits of utilizing support systems Completed 10/4/2023   Responsible User: Genny Townsend RN        Task: Discuss  methods for coping with stress    Responsible User: Genny Townsend, RN        Task: Provide education on when to seek professional counseling    Responsible User: Genny Townsend, RN          Radha Townsend RN/MARIANO Chowdary Diabetes Educator      Time Spent: 90 minutes  Encounter Type: Individual    Any diabetes medication dose changes were made via the CDE Protocol per the patient's primary care provider. A copy of this encounter was shared with the provider.

## 2023-10-14 ENCOUNTER — MYC REFILL (OUTPATIENT)
Dept: FAMILY MEDICINE | Facility: CLINIC | Age: 48
End: 2023-10-14
Payer: COMMERCIAL

## 2023-10-14 DIAGNOSIS — E11.9 TYPE 2 DIABETES MELLITUS WITHOUT COMPLICATION, WITHOUT LONG-TERM CURRENT USE OF INSULIN (H): ICD-10-CM

## 2023-10-16 RX ORDER — METFORMIN HCL 500 MG
1000 TABLET, EXTENDED RELEASE 24 HR ORAL 2 TIMES DAILY WITH MEALS
Qty: 360 TABLET | Refills: 0 | Status: SHIPPED | OUTPATIENT
Start: 2023-10-16 | End: 2023-11-26

## 2023-10-18 ENCOUNTER — HOSPITAL ENCOUNTER (OUTPATIENT)
Facility: AMBULATORY SURGERY CENTER | Age: 48
End: 2023-10-18
Attending: STUDENT IN AN ORGANIZED HEALTH CARE EDUCATION/TRAINING PROGRAM
Payer: COMMERCIAL

## 2023-10-18 ENCOUNTER — TELEPHONE (OUTPATIENT)
Dept: GASTROENTEROLOGY | Facility: CLINIC | Age: 48
End: 2023-10-18
Payer: COMMERCIAL

## 2023-10-18 NOTE — TELEPHONE ENCOUNTER
"Endoscopy Scheduling Screen    Have you had a positive Covid test in the last 14 days?  No    Are you active on MyChart?   Yes    What insurance is in the chart?  Other:  r     Ordering/Referring Provider: REGULO CORNEJO     (If ordering provider performs procedure, schedule with ordering provider unless otherwise instructed. )    BMI: Estimated body mass index is 37.12 kg/m  as calculated from the following:    Height as of 9/19/23: 1.702 m (5' 7\").    Weight as of 9/19/23: 107.5 kg (237 lb).     Sedation Ordered  moderate sedation.   If patient BMI > 50 do not schedule in [facility].    If patient BMI > 45 do not schedule at [facility].    Are you taking methadone or Suboxone?  No    Are you taking any prescription medications for pain 3 or more times per week?   No    Do you have a history of malignant hyperthermia or adverse reaction to anesthesia?  No    (Females) Are you currently pregnant?   No     Have you been diagnosed or told you have pulmonary hypertension?   No    Do you have an LVAD?  No    Have you been told you have moderate to severe sleep apnea?  No    Have you been told you have COPD, asthma, or any other lung disease?  No    Do you have any heart conditions?  No     Have you ever had an organ transplant?   No    Have you ever had or are you awaiting a heart or lung transplant?   No    Have you had a stroke or transient ischemic attack (TIA aka \"mini stroke\" in the last 6 months?   No    Have you been diagnosed with or been told you have cirrhosis of the liver?   No    Are you currently on dialysis?   No    Do you need assistance transferring?   No    BMI: Estimated body mass index is 37.12 kg/m  as calculated from the following:    Height as of 9/19/23: 1.702 m (5' 7\").    Weight as of 9/19/23: 107.5 kg (237 lb).     Is patients BMI > 40 and scheduling location [facility]?  No    Do you take an injectable medication for weight loss or diabetes (excluding insulin)?  No    Do you take the medication " Naltrexone?  No    Do you take blood thinners?  No       Prep   Are you currently on dialysis or do you have chronic kidney disease?  No    Do you have a diagnosis of diabetes?  Yes (Golytely Prep)    Do you have a diagnosis of cystic fibrosis (CF)?  No    On a regular basis do you go 3 -5 days between bowel movements?  No    BMI > 40?  No    Preferred Pharmacy:      [facility] [address]  [address] [facility] [address]  [facility] [address]  Phone: [phone] Fax: [phone]      Final Scheduling Details   Colonoscopy prep sent?  Standard Golytely    Procedure scheduled  Colonoscopy    Surgeon:  Maximo      Date of procedure:  11/08/2024     Pre-OP / PAC:   No - Not required for this site.    Location  [facility] - [facility] - Per order.    Sedation   Moderate Sedation - Per order.      Patient Reminders:   You will receive a call from a Nurse to review instructions and health history.  This assessment must be completed prior to your procedure.  Failure to complete the Nurse assessment may result in the procedure being cancelled.      On the day of your procedure, please designate an adult(s) who can drive you home stay with you for the next 24 hours. The medicines used in the exam will make you sleepy. You will not be able to drive.      You cannot take public transportation, ride share services, or non-medical taxi service without a responsible caregiver.  Medical transport services are allowed with the requirement that a responsible caregiver will receive you at your destination.  We require that drivers and caregivers are confirmed prior to your procedure.

## 2023-10-27 ENCOUNTER — TELEPHONE (OUTPATIENT)
Dept: GASTROENTEROLOGY | Facility: CLINIC | Age: 48
End: 2023-10-27
Payer: COMMERCIAL

## 2023-10-27 NOTE — TELEPHONE ENCOUNTER
Caller: Alberto  Reason for Reschedule/Cancellation (please be detailed, any staff messages or encounters to note?): Per inbasket message pt A1C levels are to high for [facility]. Pt is having them re tested in end of Nov and so he requested to just cancel for now and once he gets his results back he will call to schedule.       Prior to reschedule please review:  Ordering Provider:     Owen Rubio PA-C [facility]     Sedation per order: moderate  Does patient have any ASC Exclusions, please identify?: Y A1C      Notes on Cancelled Procedure:  Procedure: Lower Endoscopy [Colonoscopy]   Date: 11/08/2023  Location: [facility]; [address], [place] [address]  Surgeon: Maximo      Rescheduled: No

## 2023-11-07 ENCOUNTER — ALLIED HEALTH/NURSE VISIT (OUTPATIENT)
Dept: EDUCATION SERVICES | Facility: CLINIC | Age: 48
End: 2023-11-07
Payer: COMMERCIAL

## 2023-11-07 DIAGNOSIS — E11.9 TYPE 2 DIABETES MELLITUS WITHOUT COMPLICATION, WITHOUT LONG-TERM CURRENT USE OF INSULIN (H): Primary | ICD-10-CM

## 2023-11-07 PROCEDURE — G0108 DIAB MANAGE TRN  PER INDIV: HCPCS

## 2023-11-07 PROCEDURE — 99207 PR NO CHARGE NURSE ONLY: CPT

## 2023-11-07 NOTE — PROGRESS NOTES
Diabetes Self-Management Education & Support    Presents for: Follow-up    Type of Service: In Person Visit      ASSESSMENT:  Alberto is really concerned for his health and very pleased with the progress he is making.  Very healthy diet,  but needs to be patient with is body still in recovery stages now, he had a very high A1C and will take some time for his body to adjust , answered his questions the best I could , he is safe now to work out as he wishes to.  Tolerating Metformin well, in the future he could benefit from a GLP-1.  For now he is working on trying different foods to get the results he wants    Patient's most recent   Lab Results   Component Value Date    A1C 11.6 09/19/2023     is not meeting goal of <7.0    Diabetes knowledge and skills assessment:   Patient is knowledgeable in diabetes management concepts related to: Healthy Eating, Being Active, Monitoring, Taking Medication, Problem Solving, Reducing Risks, and Healthy Coping    Continue education with the following diabetes management concepts: Healthy Eating, Taking Medication, and Problem Solving    Based on learning assessment above, most appropriate setting for further diabetes education would be: Individual setting.      PLAN  Carbs total of 180-225 gms of carbs with proteins.   Water is the best  You may exercise as you wish.   Metformin  mg take 2 pills with breakfast and 2 pills with dinner  Give yourself some time to recover.    Proteins are the best.      Topics to cover at upcoming visits: Healthy Eating and Taking Medication    Follow-up: dec.    See Care Plan for co-developed, patient-state behavior change goals.  AVS provided for patient today.    Education Materials Provided:  No new materials provided today      SUBJECTIVE/OBJECTIVE:  Presents for: Follow-up  Accompanied by: Self  Diabetes education in the past 24mo: No  Focus of Visit: Diabetes Pathophysiology, Being Active, Assistance w/ making life changes, Healthy Eating,  "Taking Medication  Diabetes type: Type 2  Disease course: Improving  How confident are you filling out medical forms by yourself:: Extremely  Transportation concerns: No  Difficulty affording diabetes medication?: No  Difficulty affording diabetes testing supplies?: No  Other concerns:: None  Cultural Influences/Ethnic Background:  Choose not to answer      Diabetes Symptoms & Complications:  Fatigue: Sometimes  Neuropathy: Sometimes  Polydipsia: Sometimes  Polyphagia: No  Polyuria: Sometimes  Visual change: Sometimes  Slow healing wounds: Sometimes  Symptom course: Improving  Weight trend: Decreasing  Autonomic neuropathy: No  CVA: No  Heart disease: No  Nephropathy: No  Peripheral neuropathy: No  Peripheral Vascular Disease: No  Retinopathy: No  Sexual dysfunction: Other    Patient Problem List and Family Medical History reviewed for relevant medical history, current medical status, and diabetes risk factors.    Vitals:  There were no vitals taken for this visit.  Estimated body mass index is 37.12 kg/m  as calculated from the following:    Height as of 9/19/23: 1.702 m (5' 7\").    Weight as of 9/19/23: 107.5 kg (237 lb).   Last 3 BP:   BP Readings from Last 3 Encounters:   09/19/23 110/73   05/18/23 (!) 166/95   05/18/23 130/82       History   Smoking Status    Every Day    Packs/day: 0.75    Years: 8.00    Types: Cigarettes   Smokeless Tobacco    Never       Labs:  Lab Results   Component Value Date    A1C 11.6 09/19/2023     Lab Results   Component Value Date     09/19/2023     11/02/2021     04/05/2018     Lab Results   Component Value Date     09/19/2023     04/05/2018     HDL Cholesterol   Date Value Ref Range Status   04/05/2018 44 >39 mg/dL Final     Direct Measure HDL   Date Value Ref Range Status   09/19/2023 33 (L) >=40 mg/dL Final   ]  GFR Estimate   Date Value Ref Range Status   09/19/2023 >90 >60 mL/min/1.73m2 Final     No results found for: \"GFRESTBLACK\"  Lab " "Results   Component Value Date    CR 0.76 09/19/2023     No results found for: \"MICROALBUMIN\"    Healthy Eating:  Healthy Eating Assessed Today: Yes  Cultural/Mandaen diet restrictions?: No  Meal planning/habits: Avoiding sweets, Calorie counting, Carb counting, Low carb, Heart healthy, Low salt, Smaller portions, Keeps food records  How many times a week on average do you eat food made away from home (restaurant/take-out)?: 1  Meals include: Breakfast, Dinner  Breakfast: almond milk, zero sugar greek yogurt, 1/2 cup of rolled oats and 1/2 cup of quinua  Lunch: chx sandwich and bun and beans, like chx chili,  Dinner: 9626-3222, meat and veggies, (brussel sprouts and brochile, or tomatoes with oils and vinargaret  Snacks: was having cucs and veggies and nuts  Other: avacado and salad. some salmon, nuts or egg with a pear  Beverages: Water, Tea, Coffee  Has patient met with a dietitian in the past?: No    Being Active:  Being Active Assessed Today: Yes  Exercise:: Yes  Days per week of moderate to strenuous exercise (like a brisk walk): 7  On average, minutes per day of exercise at this level: 60  How intense was your typical exercise? : Moderate (like brisk walking)  Exercise Minutes per Week: 420  Barrier to exercise: Time, Physical limitation    Monitoring:  Monitoring Assessed Today: Yes  Did patient bring glucose meter to appointment? : Yes  Blood Glucose Meter: CGM  Times checking blood sugar at home (number): 5+  Times checking blood sugar at home (per): Day  Blood glucose trend: No change            Taking Medications:  Diabetes Medication(s)       Biguanides       metFORMIN (GLUCOPHAGE XR) 500 MG 24 hr tablet    Take 2 tablets (1,000 mg) by mouth 2 times daily (with meals)            Taking Medication Assessed Today: Yes  Current Treatments: Oral Medication (taken by mouth), Diet  Problems taking diabetes medications regularly?: No  Diabetes medication side effects?: No    Problem Solving:  Problem Solving " Assessed Today: Yes  Is the patient at risk for hypoglycemia?: No  Is the patient at risk for DKA?: No  Does patient have sick day plan for diabetes management?: Yes              Reducing Risks:  Reducing Risks Assessed Today: Yes  Diabetes Risks: Age over 45 years, Hypertriglyceridemia  CAD Risks: Diabetes Mellitus, Dyslipidemia, Obesity, Tobacco exposure, Male sex  Has dilated eye exam at least once a year?: Yes  Sees dentist every 6 months?: Yes  Feet checked by healthcare provider in the last year?: No    Healthy Coping:  Emotional response to diabetes: Ready to learn, Acceptance, Confidence diabetes can be controlled  Informal Support system:: Children, Family, Friends, Spouse  Stage of change: ACTION (Actively working towards change)  Patient Activation Measure Survey Score:      9/6/2011     7:00 AM 4/14/2023    12:00 PM   LASHONDA Score (Last Two)   LASHONDA Raw Score 39 40   Activation Score 56.4 100   LASHONDA Level 3 4         Care Plan and Education Provided:  Care Plan: Diabetes   Updates made by Genny Townsend RN since 11/7/2023 12:00 AM        Problem: HbA1C Not In Goal         Goal: Establish Regular Follow-Ups with PCP    Start Date: 11/7/2023   This Visit's Progress: 100%        Goal: Get HbA1C Level in Goal    Start Date: 11/7/2023   This Visit's Progress: 100%   Recent Progress: 0%        Problem: Diabetes Self-Management Education Needed to Optimize Self-Care Behaviors         Goal: Understand diabetes pathophysiology and disease progression    Start Date: 11/7/2023   This Visit's Progress: 90%        Goal: Healthy Eating - follow a healthy eating pattern for diabetes    Start Date: 11/7/2023   This Visit's Progress: 90%   Recent Progress: 30%        Task: Provide education on weight management Completed 11/7/2023   Responsible User: Genny Townsend, RN        Task: Provide education on heart healthy eating Completed 11/7/2023   Responsible User: Genny Townsend, LILLIANA        Goal: Being Active - get regular  physical activity, working up to at least 150 minutes per week    Start Date: 11/7/2023   This Visit's Progress: 100%   Recent Progress: 20%        Goal: Monitoring - monitor glucose and ketones as directed    Start Date: 11/7/2023   This Visit's Progress: 100%   Recent Progress: 10%        Goal: Taking Medication - patient is consistently taking medications as directed    Start Date: 11/7/2023   This Visit's Progress: 100%   Recent Progress: 20%        Goal: Problem Solving - know how to prevent and manage short-term diabetes complications    Start Date: 11/7/2023   This Visit's Progress: 80%        Goal: Reducing Risks - know how to prevent and treat long-term diabetes complications    Start Date: 11/7/2023   This Visit's Progress: 90%        Task: Provide education on major complications of diabetes, prevention, early diagnostic measures and treatment of complications Completed 11/7/2023   Responsible User: Genny Townsend RN        Task: Provide education on recommended care for dental, eye and foot health Completed 11/7/2023   Responsible User: Genny Townsend RN        Task: Provide education on Hemoglobin A1c - goals and relationship to blood glucose levels Completed 11/7/2023   Responsible User: Genny Townsend RN        Task: Provide education on recommendations for heart health - lipid levels and goals, blood pressure and goals, and aspirin therapy, if indicated Completed 11/7/2023   Responsible User: Genny Townsend RN        Goal: Healthy Coping - use available resources to cope with the challenges of managing diabetes    Start Date: 11/7/2023   This Visit's Progress: 100%   Recent Progress: 20%        Task: Discuss methods for coping with stress Completed 11/7/2023   Responsible User: Genny Townsend RN Sue Truhler RN/MARIANO  [place] Diabetes Educator      Time Spent: 60 minutes  Encounter Type: Individual    Any diabetes medication dose changes were made via the CDE Protocol per the patient's  primary care provider. A copy of this encounter was shared with the provider.

## 2023-11-07 NOTE — LETTER
11/7/2023         RE: Alberto Fuentes  [id number] [place] [address]        Dear Colleague,    Thank you for referring your patient, Alberto Fuentes, to the [facility]. Please see a copy of my visit note below.    Diabetes Self-Management Education & Support    Presents for: Follow-up    Type of Service: In Person Visit      ASSESSMENT:  Alberto is really concerned for his health and very pleased with the progress he is making.  Very healthy diet,  but needs to be patient with is body still in recovery stages now, he had a very high A1C and will take some time for his body to adjust , answered his questions the best I could , he is safe now to work out as he wishes to.  Tolerating Metformin well, in the future he could benefit from a GLP-1.  For now he is working on trying different foods to get the results he wants    Patient's most recent   Lab Results   Component Value Date    A1C 11.6 09/19/2023     is not meeting goal of <7.0    Diabetes knowledge and skills assessment:   Patient is knowledgeable in diabetes management concepts related to: Healthy Eating, Being Active, Monitoring, Taking Medication, Problem Solving, Reducing Risks, and Healthy Coping    Continue education with the following diabetes management concepts: Healthy Eating, Taking Medication, and Problem Solving    Based on learning assessment above, most appropriate setting for further diabetes education would be: Individual setting.      PLAN  Carbs total of 180-225 gms of carbs with proteins.   Water is the best  You may exercise as you wish.   Metformin  mg take 2 pills with breakfast and 2 pills with dinner  Give yourself some time to recover.    Proteins are the best.      Topics to cover at upcoming visits: Healthy Eating and Taking Medication    Follow-up: dec.    See Care Plan for co-developed, patient-state behavior change goals.  AVS provided for patient today.    Education Materials Provided:  No  "new materials provided today      SUBJECTIVE/OBJECTIVE:  Presents for: Follow-up  Accompanied by: Self  Diabetes education in the past 24mo: No  Focus of Visit: Diabetes Pathophysiology, Being Active, Assistance w/ making life changes, Healthy Eating, Taking Medication  Diabetes type: Type 2  Disease course: Improving  How confident are you filling out medical forms by yourself:: Extremely  Transportation concerns: No  Difficulty affording diabetes medication?: No  Difficulty affording diabetes testing supplies?: No  Other concerns:: None  Cultural Influences/Ethnic Background:  Choose not to answer      Diabetes Symptoms & Complications:  Fatigue: Sometimes  Neuropathy: Sometimes  Polydipsia: Sometimes  Polyphagia: No  Polyuria: Sometimes  Visual change: Sometimes  Slow healing wounds: Sometimes  Symptom course: Improving  Weight trend: Decreasing  Autonomic neuropathy: No  CVA: No  Heart disease: No  Nephropathy: No  Peripheral neuropathy: No  Peripheral Vascular Disease: No  Retinopathy: No  Sexual dysfunction: Other    Patient Problem List and Family Medical History reviewed for relevant medical history, current medical status, and diabetes risk factors.    Vitals:  There were no vitals taken for this visit.  Estimated body mass index is 37.12 kg/m  as calculated from the following:    Height as of 9/19/23: 1.702 m (5' 7\").    Weight as of 9/19/23: 107.5 kg (237 lb).   Last 3 BP:   BP Readings from Last 3 Encounters:   09/19/23 110/73   05/18/23 (!) 166/95   05/18/23 130/82       History   Smoking Status     Every Day     Packs/day: 0.75     Years: 8.00     Types: Cigarettes   Smokeless Tobacco     Never       Labs:  Lab Results   Component Value Date    A1C 11.6 09/19/2023     Lab Results   Component Value Date     09/19/2023     11/02/2021     04/05/2018     Lab Results   Component Value Date     09/19/2023     04/05/2018     HDL Cholesterol   Date Value Ref Range Status " "  04/05/2018 44 >39 mg/dL Final     Direct Measure HDL   Date Value Ref Range Status   09/19/2023 33 (L) >=40 mg/dL Final   ]  GFR Estimate   Date Value Ref Range Status   09/19/2023 >90 >60 mL/min/1.73m2 Final     No results found for: \"GFRESTBLACK\"  Lab Results   Component Value Date    CR 0.76 09/19/2023     No results found for: \"MICROALBUMIN\"    Healthy Eating:  Healthy Eating Assessed Today: Yes  Cultural/Denominational diet restrictions?: No  Meal planning/habits: Avoiding sweets, Calorie counting, Carb counting, Low carb, Heart healthy, Low salt, Smaller portions, Keeps food records  How many times a week on average do you eat food made away from home (restaurant/take-out)?: 1  Meals include: Breakfast, Dinner  Breakfast: almond milk, zero sugar greek yogurt, 1/2 cup of rolled oats and 1/2 cup of quinua  Lunch: chx sandwich and bun and beans, like chx chili,  Dinner: 1803-2027, meat and veggies, (brussel sprouts and brochile, or tomatoes with oils and vinargaret  Snacks: was having cucs and veggies and nuts  Other: avacado and salad. some salmon, nuts or egg with a pear  Beverages: Water, Tea, Coffee  Has patient met with a dietitian in the past?: No    Being Active:  Being Active Assessed Today: Yes  Exercise:: Yes  Days per week of moderate to strenuous exercise (like a brisk walk): 7  On average, minutes per day of exercise at this level: 60  How intense was your typical exercise? : Moderate (like brisk walking)  Exercise Minutes per Week: 420  Barrier to exercise: Time, Physical limitation    Monitoring:  Monitoring Assessed Today: Yes  Did patient bring glucose meter to appointment? : Yes  Blood Glucose Meter: CGM  Times checking blood sugar at home (number): 5+  Times checking blood sugar at home (per): Day  Blood glucose trend: No change            Taking Medications:  Diabetes Medication(s)       Biguanides       metFORMIN (GLUCOPHAGE XR) 500 MG 24 hr tablet    Take 2 tablets (1,000 mg) by mouth 2 times " daily (with meals)            Taking Medication Assessed Today: Yes  Current Treatments: Oral Medication (taken by mouth), Diet  Problems taking diabetes medications regularly?: No  Diabetes medication side effects?: No    Problem Solving:  Problem Solving Assessed Today: Yes  Is the patient at risk for hypoglycemia?: No  Is the patient at risk for DKA?: No  Does patient have sick day plan for diabetes management?: Yes              Reducing Risks:  Reducing Risks Assessed Today: Yes  Diabetes Risks: Age over 45 years, Hypertriglyceridemia  CAD Risks: Diabetes Mellitus, Dyslipidemia, Obesity, Tobacco exposure, Male sex  Has dilated eye exam at least once a year?: Yes  Sees dentist every 6 months?: Yes  Feet checked by healthcare provider in the last year?: No    Healthy Coping:  Emotional response to diabetes: Ready to learn, Acceptance, Confidence diabetes can be controlled  Informal Support system:: Children, Family, Friends, Spouse  Stage of change: ACTION (Actively working towards change)  Patient Activation Measure Survey Score:      9/6/2011     7:00 AM 4/14/2023    12:00 PM   LASHONDA Score (Last Two)   LASHONDA Raw Score 39 40   Activation Score 56.4 100   LASHONDA Level 3 4         Care Plan and Education Provided:  Care Plan: Diabetes   Updates made by Genny Townsend, RN since 11/7/2023 12:00 AM        Problem: HbA1C Not In Goal         Goal: Establish Regular Follow-Ups with PCP    Start Date: 11/7/2023   This Visit's Progress: 100%        Goal: Get HbA1C Level in Goal    Start Date: 11/7/2023   This Visit's Progress: 100%   Recent Progress: 0%        Problem: Diabetes Self-Management Education Needed to Optimize Self-Care Behaviors         Goal: Understand diabetes pathophysiology and disease progression    Start Date: 11/7/2023   This Visit's Progress: 90%        Goal: Healthy Eating - follow a healthy eating pattern for diabetes    Start Date: 11/7/2023   This Visit's Progress: 90%   Recent Progress: 30%        Task:  Provide education on weight management Completed 11/7/2023   Responsible User: Genny Townsend RN        Task: Provide education on heart healthy eating Completed 11/7/2023   Responsible User: Genny Townsend RN        Goal: Being Active - get regular physical activity, working up to at least 150 minutes per week    Start Date: 11/7/2023   This Visit's Progress: 100%   Recent Progress: 20%        Goal: Monitoring - monitor glucose and ketones as directed    Start Date: 11/7/2023   This Visit's Progress: 100%   Recent Progress: 10%        Goal: Taking Medication - patient is consistently taking medications as directed    Start Date: 11/7/2023   This Visit's Progress: 100%   Recent Progress: 20%        Goal: Problem Solving - know how to prevent and manage short-term diabetes complications    Start Date: 11/7/2023   This Visit's Progress: 80%        Goal: Reducing Risks - know how to prevent and treat long-term diabetes complications    Start Date: 11/7/2023   This Visit's Progress: 90%        Task: Provide education on major complications of diabetes, prevention, early diagnostic measures and treatment of complications Completed 11/7/2023   Responsible User: Genny Townsend RN        Task: Provide education on recommended care for dental, eye and foot health Completed 11/7/2023   Responsible User: Genny Townsend RN        Task: Provide education on Hemoglobin A1c - goals and relationship to blood glucose levels Completed 11/7/2023   Responsible User: Genny Townsend RN        Task: Provide education on recommendations for heart health - lipid levels and goals, blood pressure and goals, and aspirin therapy, if indicated Completed 11/7/2023   Responsible User: Genny Townsend RN        Goal: Healthy Coping - use available resources to cope with the challenges of managing diabetes    Start Date: 11/7/2023   This Visit's Progress: 100%   Recent Progress: 20%        Task: Discuss methods for coping with stress Completed  11/7/2023   Responsible User: Genny Townsend RN Sue Truhler RN/MARIANO  [place] Diabetes Educator      Time Spent: 60 minutes  Encounter Type: Individual    Any diabetes medication dose changes were made via the CDE Protocol per the patient's primary care provider. A copy of this encounter was shared with the provider.

## 2023-11-07 NOTE — PATIENT INSTRUCTIONS
Diabetes Support Resources:  Carbs total of 180-225 gms of carbs with proteins.   Water is the best  You may exercise as you wish.   Metformin  mg take 2 pills with breakfast and 2 pills with dinner  Give yourself some time to recover.    Proteins are the best.       Bring blood glucose meter and logbook with you to all doctor and follow-up appointments.    Diabetes Education Telephone Visit Follow-up:    We realize your time is valuable and your health is important! We offer a convenient Telephone Visit follow up! It s a quick way to check in for a medication dose adjustment without having to come back to clinic as soon.    Telephone Visits are often covered by insurance. Please check with your insurance plan to see if this type of visit is covered. If not, the cost is less expensive than an office visit:    Up to 10 minutes (Code 17951): $30  11-20 minutes (Code 75327): $59  More than 20 minutes (Code 43417): $85    Talk with your Diabetes Educator if you want to learn more.      [facility]:  For Your Diabetes Education and Nutrition Appointments Call:  [phone]   For Diabetes Education or Nutrition Related Questions:   Phone: [phone]  Send What's in My Handbag Message   If you need a medication refill please contact your pharmacy. Please allow 3 business days for your refills to be completed.

## 2023-11-27 ENCOUNTER — OFFICE VISIT (OUTPATIENT)
Dept: FAMILY MEDICINE | Facility: CLINIC | Age: 48
End: 2023-11-27
Payer: COMMERCIAL

## 2023-11-27 ENCOUNTER — MYC MEDICAL ADVICE (OUTPATIENT)
Dept: FAMILY MEDICINE | Facility: CLINIC | Age: 48
End: 2023-11-27

## 2023-11-27 VITALS
OXYGEN SATURATION: 94 % | HEART RATE: 78 BPM | HEIGHT: 67 IN | BODY MASS INDEX: 37.83 KG/M2 | RESPIRATION RATE: 16 BRPM | TEMPERATURE: 98.3 F | WEIGHT: 241 LBS | SYSTOLIC BLOOD PRESSURE: 112 MMHG | DIASTOLIC BLOOD PRESSURE: 72 MMHG

## 2023-11-27 DIAGNOSIS — E11.9 TYPE 2 DIABETES MELLITUS WITHOUT COMPLICATION, WITHOUT LONG-TERM CURRENT USE OF INSULIN (H): Primary | ICD-10-CM

## 2023-11-27 DIAGNOSIS — E78.1 HYPERTRIGLYCERIDEMIA: ICD-10-CM

## 2023-11-27 DIAGNOSIS — Z12.11 SCREEN FOR COLON CANCER: ICD-10-CM

## 2023-11-27 LAB
CHOLEST SERPL-MCNC: 240 MG/DL
CREAT UR-MCNC: 67.3 MG/DL
HBA1C MFR BLD: 6.9 % (ref 0–5.6)
HDLC SERPL-MCNC: 40 MG/DL
LDLC SERPL CALC-MCNC: 164 MG/DL
MICROALBUMIN UR-MCNC: <12 MG/L
MICROALBUMIN/CREAT UR: NORMAL MG/G{CREAT}
NONHDLC SERPL-MCNC: 200 MG/DL
TRIGL SERPL-MCNC: 181 MG/DL

## 2023-11-27 PROCEDURE — 99214 OFFICE O/P EST MOD 30 MIN: CPT | Performed by: FAMILY MEDICINE

## 2023-11-27 PROCEDURE — 82043 UR ALBUMIN QUANTITATIVE: CPT | Performed by: FAMILY MEDICINE

## 2023-11-27 PROCEDURE — 82570 ASSAY OF URINE CREATININE: CPT | Performed by: FAMILY MEDICINE

## 2023-11-27 PROCEDURE — 83036 HEMOGLOBIN GLYCOSYLATED A1C: CPT | Performed by: FAMILY MEDICINE

## 2023-11-27 PROCEDURE — 80061 LIPID PANEL: CPT | Performed by: FAMILY MEDICINE

## 2023-11-27 PROCEDURE — 36415 COLL VENOUS BLD VENIPUNCTURE: CPT | Performed by: FAMILY MEDICINE

## 2023-11-27 RX ORDER — METFORMIN HCL 500 MG
1000 TABLET, EXTENDED RELEASE 24 HR ORAL 2 TIMES DAILY WITH MEALS
Qty: 360 TABLET | Refills: 1 | Status: SHIPPED | OUTPATIENT
Start: 2023-11-27 | End: 2024-06-04

## 2023-11-27 RX ORDER — ATORVASTATIN CALCIUM 20 MG/1
20 TABLET, FILM COATED ORAL DAILY
Qty: 90 TABLET | Refills: 0 | Status: CANCELLED | OUTPATIENT
Start: 2023-11-27

## 2023-11-27 RX ORDER — ASPIRIN 81 MG/1
81 TABLET ORAL DAILY
Start: 2023-11-27

## 2023-11-27 ASSESSMENT — PAIN SCALES - GENERAL: PAINLEVEL: NO PAIN (0)

## 2023-11-27 NOTE — PROGRESS NOTES
Assessment & Plan     Type 2 diabetes mellitus without complication, without long-term current use of insulin (H)  Most likely exacerbated by covid which he has a few weeks before his last A1c  Now on metformin is much improved  Is on aspirin  Will retry the lipitor  UTD with eye eam  - metFORMIN (GLUCOPHAGE XR) 500 MG 24 hr tablet; Take 2 tablets (1,000 mg) by mouth 2 times daily (with meals)  - Albumin Random Urine Quantitative with Creat Ratio; Future  - **Hemoglobin A1c FUTURE 3mo; Future  - Lipid panel reflex to direct LDL Fasting; Future  - Albumin Random Urine Quantitative with Creat Ratio  - **Hemoglobin A1c FUTURE 3mo  - Lipid panel reflex to direct LDL Fasting    Hypertriglyceridemia  recheck  - Lipid panel reflex to direct LDL Fasting; Future  - Lipid panel reflex to direct LDL Fasting    Screen for colon cancer  GI would not do colonoscopy until his A1c came down                 Krupa Zayas MD  [facility] KAREEN Dominguez is a 48 year old, presenting for the following health issues:  Diabetes      11/27/2023     7:00 AM   Additional Questions   Roomed by khushi       History of Present Illness       Diabetes:   He presents for follow up of diabetes.   He is checking home blood glucose with a continuous glucose monitor.   He checks blood glucose before meals, after meals, before and after meals and at bedtime.  Blood glucose is never over 200 and never under 70. He is aware of hypoglycemia symptoms including none.   He is concerned about other.    He is not experiencing numbness or burning in feet, excessive thirst, blurry vision, weight changes or redness, sores or blisters on feet. The patient has had a diabetic eye exam in the last 12 months. Eye exam performed on 10/23. [facility]        He eats 4 or more servings of fruits and vegetables daily.He consumes 0 sweetened beverage(s) daily.He exercises with enough effort to increase his  "heart rate 9 or less minutes per day.  He exercises with enough effort to increase his heart rate 4 days per week.   He is taking medications regularly.      Pt with diabetes with A1c much higher after covid  Now BSs in normal range most of the time per cgm. Has even had a few lows  Suspect covid elevated BSs temporarily            Review of Systems   Constitutional, HEENT, cardiovascular, pulmonary, gi and gu systems are negative, except as otherwise noted.      Objective    /72   Pulse 78   Temp 98.3  F (36.8  C) (Oral)   Resp 16   Ht 1.702 m (5' 7\")   Wt 109.3 kg (241 lb)   SpO2 94%   BMI 37.75 kg/m    Body mass index is 37.75 kg/m .  Physical Exam   GENERAL: healthy, alert and no distress  NECK: no adenopathy, no asymmetry, masses, or scars and thyroid normal to palpation  RESP: lungs clear to auscultation - no rales, rhonchi or wheezes  CV: regular rate and rhythm, normal S1 S2, no S3 or S4, no murmur, click or rub, no peripheral edema and peripheral pulses strong  MS: no gross musculoskeletal defects noted, no edema    Results for orders placed or performed in visit on 11/27/23 (from the past 24 hour(s))   **Hemoglobin A1c FUTURE 3mo   Result Value Ref Range    Hemoglobin A1C 6.9 (H) 0.0 - 5.6 %                     "

## 2023-11-27 NOTE — TELEPHONE ENCOUNTER
Routing to provider to review and advise.    Kristina Kjellberg, MSN, RN  [facility] Triage

## 2023-11-29 ENCOUNTER — ALLIED HEALTH/NURSE VISIT (OUTPATIENT)
Dept: FAMILY MEDICINE | Facility: CLINIC | Age: 48
End: 2023-11-29
Payer: COMMERCIAL

## 2023-11-29 DIAGNOSIS — Z23 ENCOUNTER FOR IMMUNIZATION: Primary | ICD-10-CM

## 2023-11-29 PROCEDURE — 90750 HZV VACC RECOMBINANT IM: CPT

## 2023-11-29 PROCEDURE — 90471 IMMUNIZATION ADMIN: CPT

## 2023-11-29 PROCEDURE — 99207 PR NO CHARGE NURSE ONLY: CPT

## 2023-11-29 NOTE — PROGRESS NOTES
Prior to immunization administration, verified patients identity using patient s name and date of birth. Please see Immunization Activity for additional information.     Screening Questionnaire for Adult Immunization    Are you sick today?   No   Do you have allergies to medications, food, a vaccine component or latex?   No   Have you ever had a serious reaction after receiving a vaccination?   No   Do you have a long-term health problem with heart, lung, kidney, or metabolic disease (e.g., diabetes), asthma, a blood disorder, no spleen, complement component deficiency, a cochlear implant, or a spinal fluid leak?  Are you on long-term aspirin therapy?   No   Do you have cancer, leukemia, HIV/AIDS, or any other immune system problem?   No   Do you have a parent, brother, or sister with an immune system problem?   No   In the past 3 months, have you taken medications that affect  your immune system, such as prednisone, other steroids, or anticancer drugs; drugs for the treatment of rheumatoid arthritis, Crohn s disease, or psoriasis; or have you had radiation treatments?   No   Have you had a seizure, or a brain or other nervous system problem?   No   During the past year, have you received a transfusion of blood or blood    products, or been given immune (gamma) globulin or antiviral drug?   No   For women: Are you pregnant or is there a chance you could become       pregnant during the next month?   No   Have you received any vaccinations in the past 4 weeks?   No     Immunization questionnaire answers were all negative.    I have reviewed the following standing orders: Not Applicable; Order were already placed prior to ancillary visit    Patient instructed to remain in clinic for 15 minutes afterwards, and to report any adverse reactions.     Screening performed by MARIBELL FRAUSTO MA on 11/29/2023 at 3:24 PM.

## 2023-12-12 ENCOUNTER — ALLIED HEALTH/NURSE VISIT (OUTPATIENT)
Dept: EDUCATION SERVICES | Facility: CLINIC | Age: 48
End: 2023-12-12
Payer: COMMERCIAL

## 2023-12-12 DIAGNOSIS — E11.9 TYPE 2 DIABETES MELLITUS WITHOUT COMPLICATION, WITHOUT LONG-TERM CURRENT USE OF INSULIN (H): Primary | ICD-10-CM

## 2023-12-12 PROCEDURE — 99207 PR NO CHARGE NURSE ONLY: CPT

## 2023-12-12 PROCEDURE — G0108 DIAB MANAGE TRN  PER INDIV: HCPCS

## 2023-12-12 NOTE — PROGRESS NOTES
Diabetes Self-Management Education & Support    Presents for: Follow-up    Type of Service: In Person Visit      ASSESSMENT:  Alberto is pro active in his DSMT . He has been following the meal plan, but did gain a few more lbs and not too happy, A1C has been amazing.  He is going to back off some of the carbs/calories which I agree with .  Plan to increase exercising, and follow up in 3 months.  Had a few lows , but only felt hungry, but very rare on Metformin.      Patient's most recent   Lab Results   Component Value Date    A1C 6.9 11/27/2023     is meeting goal of <7.0    Diabetes knowledge and skills assessment:   Patient is knowledgeable in diabetes management concepts related to: Healthy Eating, Being Active, Monitoring, Taking Medication, Problem Solving, Reducing Risks, and Healthy Coping    Continue education with the following diabetes management concepts: Taking Medication    Based on learning assessment above, most appropriate setting for further diabetes education would be: Individual setting.      PLAN      TKeep Metformin  mg take 4 pills daily, we can then discuss dropping the dose  Cut back on the carbs at least 130 gms total for the brain,   Exercise plays a huge role in moving BG levels.   Losing weight will be a benefit.  Jesse seeds are great.  Let us see how the A1C goes, and then can drop the Metformin  Topics to cover at upcoming visits: Taking Medication    Follow-up: 3 months    See Care Plan for co-developed, patient-state behavior change goals.  AVS provided for patient today.    Education Materials Provided:  No new materials provided today      SUBJECTIVE/OBJECTIVE:  Presents for: Follow-up  Accompanied by: Self  Diabetes education in the past 24mo: No  Focus of Visit: Diabetes Pathophysiology, Being Active, Assistance w/ making life changes, Healthy Eating, Taking Medication  Diabetes type: Type 2  Disease course: Improving  How confident are you filling out medical forms by  "yourself:: Extremely  Transportation concerns: No  Difficulty affording diabetes medication?: No  Difficulty affording diabetes testing supplies?: No  Other concerns:: None  Cultural Influences/Ethnic Background:  Choose not to answer      Diabetes Symptoms & Complications:  Fatigue: Sometimes  Neuropathy: No  Polydipsia: No  Polyphagia: No  Polyuria: No  Visual change: Sometimes  Slow healing wounds: Sometimes  Symptom course: Improving  Weight trend: Fluctuating  Autonomic neuropathy: No  CVA: No  Heart disease: No  Nephropathy: No  Peripheral neuropathy: No  Peripheral Vascular Disease: No  Retinopathy: No  Sexual dysfunction: Other    Patient Problem List and Family Medical History reviewed for relevant medical history, current medical status, and diabetes risk factors.    Vitals:  There were no vitals taken for this visit.  Estimated body mass index is 37.75 kg/m  as calculated from the following:    Height as of 11/27/23: 1.702 m (5' 7\").    Weight as of 11/27/23: 109.3 kg (241 lb).   Last 3 BP:   BP Readings from Last 3 Encounters:   11/27/23 112/72   09/19/23 110/73   05/18/23 (!) 166/95       History   Smoking Status    Every Day    Packs/day: 0.75    Years: 8.00    Types: Cigarettes   Smokeless Tobacco    Never       Labs:  Lab Results   Component Value Date    A1C 6.9 11/27/2023     Lab Results   Component Value Date     09/19/2023     11/02/2021     04/05/2018     Lab Results   Component Value Date     11/27/2023     04/05/2018     HDL Cholesterol   Date Value Ref Range Status   04/05/2018 44 >39 mg/dL Final     Direct Measure HDL   Date Value Ref Range Status   11/27/2023 40 >=40 mg/dL Final   ]  GFR Estimate   Date Value Ref Range Status   09/19/2023 >90 >60 mL/min/1.73m2 Final     No results found for: \"GFRESTBLACK\"  Lab Results   Component Value Date    CR 0.76 09/19/2023     No results found for: \"MICROALBUMIN\"    Healthy Eating:  Healthy Eating Assessed Today: " Yes  Cultural/Mu-ism diet restrictions?: No  Meal planning/habits: Avoiding sweets, Calorie counting, Carb counting, Low carb, Heart healthy, Low salt, Smaller portions, Keeps food records  How many times a week on average do you eat food made away from home (restaurant/take-out)?: 1  Meals include: Breakfast, Dinner  Breakfast: almond milk, zero sugar greek yogurt, 1/2 cup of rolled oats and 1/2 cup of quinua  Lunch: chx sandwich and bun and beans, like chx chili,  Dinner: [id number], meat and veggies, (brussel sprouts and brochile, or tomatoes with oils and vinargaret  Snacks: was having cucs and veggies and nuts  Other: avacado and salad. some salmon, nuts or egg with a pear  Beverages: Water, Tea, Coffee  Has patient met with a dietitian in the past?: No    Being Active:  Being Active Assessed Today: Yes  Exercise:: Yes  Days per week of moderate to strenuous exercise (like a brisk walk): 7  On average, minutes per day of exercise at this level: 60  How intense was your typical exercise? : Moderate (like brisk walking)  Exercise Minutes per Week: 420  Barrier to exercise: Time, Physical limitation    Monitoring:  Monitoring Assessed Today: Yes  Did patient bring glucose meter to appointment? : Yes  Blood Glucose Meter: CGM  Times checking blood sugar at home (number): 5+  Times checking blood sugar at home (per): Day  Blood glucose trend: No change                Taking Medications:  Diabetes Medication(s)       Biguanides       metFORMIN (GLUCOPHAGE XR) 500 MG 24 hr tablet    Take 2 tablets (1,000 mg) by mouth 2 times daily (with meals)            Taking Medication Assessed Today: Yes  Current Treatments: Oral Medication (taken by mouth), Diet  Problems taking diabetes medications regularly?: No  Diabetes medication side effects?: No    Problem Solving:  Problem Solving Assessed Today: Yes  Is the patient at risk for hypoglycemia?: No  Is the patient at risk for DKA?: No  Does patient have sick day plan for  diabetes management?: Yes              Reducing Risks:  Reducing Risks Assessed Today: Yes  Diabetes Risks: Age over 45 years, Hypertriglyceridemia  CAD Risks: Diabetes Mellitus, Dyslipidemia, Obesity, Tobacco exposure, Male sex  Has dilated eye exam at least once a year?: Yes  Sees dentist every 6 months?: Yes  Feet checked by healthcare provider in the last year?: Yes    Healthy Coping:  Emotional response to diabetes: Ready to learn, Acceptance, Confidence diabetes can be controlled  Informal Support system:: Children, Family, Friends, Spouse  Stage of change: ACTION (Actively working towards change)  Patient Activation Measure Survey Score:      9/6/2011     7:00 AM 4/14/2023    12:00 PM   LASHONDA Score (Last Two)   LASHONDA Raw Score 39 40   Activation Score 56.4 100   LASHONDA Level 3 4         Care Plan and Education Provided:  Care Plan: Diabetes   Updates made by Genny Townsend RN since 12/12/2023 12:00 AM        Problem: HbA1C Not In Goal         Goal: Establish Regular Follow-Ups with PCP    Start Date: 12/12/2023   Recent Progress: 100%        Goal: Get HbA1C Level in Goal    Start Date: 12/12/2023   Recent Progress: 100%        Problem: Diabetes Self-Management Education Needed to Optimize Self-Care Behaviors         Goal: Understand diabetes pathophysiology and disease progression    Start Date: 12/12/2023   This Visit's Progress: 100%   Recent Progress: 90%        Goal: Healthy Eating - follow a healthy eating pattern for diabetes    Start Date: 12/12/2023   This Visit's Progress: 100%   Recent Progress: 90%        Goal: Being Active - get regular physical activity, working up to at least 150 minutes per week    Start Date: 12/12/2023   Recent Progress: 100%        Goal: Monitoring - monitor glucose and ketones as directed    Start Date: 12/12/2023   Recent Progress: 100%        Goal: Taking Medication - patient is consistently taking medications as directed    Start Date: 12/12/2023   Recent Progress: 100%         Goal: Problem Solving - know how to prevent and manage short-term diabetes complications    Start Date: 12/12/2023   This Visit's Progress: 100%   Recent Progress: 80%        Goal: Reducing Risks - know how to prevent and treat long-term diabetes complications    Start Date: 12/12/2023   This Visit's Progress: 100%   Recent Progress: 90%        Goal: Healthy Coping - use available resources to cope with the challenges of managing diabetes    Start Date: 12/12/2023   Recent Progress: 100%          Radha Townsend RN/MARIANO Chowdary Diabetes Educator      Time Spent: 60 minutes  Encounter Type: Individual    Any diabetes medication dose changes were made via the CDE Protocol per the patient's primary care provider. A copy of this encounter was shared with the provider.

## 2023-12-12 NOTE — PATIENT INSTRUCTIONS
Diabetes Support Resources:  Keep Metformin  mg take 4 pills daily, we can then discuss dropping the dose  Cut back on the carbs at least 130 gms total for the brain,   Exercise plays a huge role in moving BG levels.   Losing weight will be a benefit.  Jesse seeds are great.  Let us see how the A1C goes, and then can drop the Metformin       Bring blood glucose meter and logbook with you to all doctor and follow-up appointments.    Diabetes Education Telephone Visit Follow-up:    We realize your time is valuable and your health is important! We offer a convenient Telephone Visit follow up! It s a quick way to check in for a medication dose adjustment without having to come back to clinic as soon.    Telephone Visits are often covered by insurance. Please check with your insurance plan to see if this type of visit is covered. If not, the cost is less expensive than an office visit:    Up to 10 minutes (Code 12028): $30  11-20 minutes (Code 03427): $59  More than 20 minutes (Code 51380): $85    Talk with your Diabetes Educator if you want to learn more.      [facility]:  For Your Diabetes Education and Nutrition Appointments Call:  [phone]   For Diabetes Education or Nutrition Related Questions:   Phone: [phone]  Send [employer] Message   If you need a medication refill please contact your pharmacy. Please allow 3 business days for your refills to be completed.       Bring blood glucose meter and logbook with you to all doctor and follow-up appointments.    Diabetes Education Telephone Visit Follow-up:    We realize your time is valuable and your health is important! We offer a convenient Telephone Visit follow up! It s a quick way to check in for a medication dose adjustment without having to come back to clinic as soon.    Telephone Visits are often covered by insurance. Please check with your insurance plan to see if this type of visit is covered. If not, the cost  is less expensive than an office visit:    Up to 10 minutes (Code 26116): $30  11-20 minutes (Code 17047): $59  More than 20 minutes (Code 96607): $85    Talk with your Diabetes Educator if you want to learn more.      [facility]:  For Your Diabetes Education and Nutrition Appointments Call:  [phone]   For Diabetes Education or Nutrition Related Questions:   Phone: [phone]  Send [employer] Message   If you need a medication refill please contact your pharmacy. Please allow 3 business days for your refills to be completed.

## 2023-12-12 NOTE — LETTER
12/12/2023         RE: Alberto Fuentes  [address] [place] [address]        Dear Colleague,    Thank you for referring your patient, Alberto Fuentes, to the [facility]. Please see a copy of my visit note below.    Diabetes Self-Management Education & Support    Presents for: Follow-up    Type of Service: In Person Visit      ASSESSMENT:  Alberto is pro active in his DSMT . He has been following the meal plan, but did gain a few more lbs and not too happy, A1C has been amazing.  He is going to back off some of the carbs/calories which I agree with .  Plan to increase exercising, and follow up in 3 months.  Had a few lows , but only felt hungry, but very rare on Metformin.      Patient's most recent   Lab Results   Component Value Date    A1C 6.9 11/27/2023     is meeting goal of <7.0    Diabetes knowledge and skills assessment:   Patient is knowledgeable in diabetes management concepts related to: Healthy Eating, Being Active, Monitoring, Taking Medication, Problem Solving, Reducing Risks, and Healthy Coping    Continue education with the following diabetes management concepts: Taking Medication    Based on learning assessment above, most appropriate setting for further diabetes education would be: Individual setting.      PLAN      TKeep Metformin  mg take 4 pills daily, we can then discuss dropping the dose  Cut back on the carbs at least 130 gms total for the brain,   Exercise plays a huge role in moving BG levels.   Losing weight will be a benefit.  Jesse seeds are great.  Let us see how the A1C goes, and then can drop the Metformin  Topics to cover at upcoming visits: Taking Medication    Follow-up: 3 months    See Care Plan for co-developed, patient-state behavior change goals.  AVS provided for patient today.    Education Materials Provided:  No new materials provided today      SUBJECTIVE/OBJECTIVE:  Presents for: Follow-up  Accompanied by: Self  Diabetes education in  "the past 24mo: No  Focus of Visit: Diabetes Pathophysiology, Being Active, Assistance w/ making life changes, Healthy Eating, Taking Medication  Diabetes type: Type 2  Disease course: Improving  How confident are you filling out medical forms by yourself:: Extremely  Transportation concerns: No  Difficulty affording diabetes medication?: No  Difficulty affording diabetes testing supplies?: No  Other concerns:: None  Cultural Influences/Ethnic Background:  Choose not to answer      Diabetes Symptoms & Complications:  Fatigue: Sometimes  Neuropathy: No  Polydipsia: No  Polyphagia: No  Polyuria: No  Visual change: Sometimes  Slow healing wounds: Sometimes  Symptom course: Improving  Weight trend: Fluctuating  Autonomic neuropathy: No  CVA: No  Heart disease: No  Nephropathy: No  Peripheral neuropathy: No  Peripheral Vascular Disease: No  Retinopathy: No  Sexual dysfunction: Other    Patient Problem List and Family Medical History reviewed for relevant medical history, current medical status, and diabetes risk factors.    Vitals:  There were no vitals taken for this visit.  Estimated body mass index is 37.75 kg/m  as calculated from the following:    Height as of 11/27/23: 1.702 m (5' 7\").    Weight as of 11/27/23: 109.3 kg (241 lb).   Last 3 BP:   BP Readings from Last 3 Encounters:   11/27/23 112/72   09/19/23 110/73   05/18/23 (!) 166/95       History   Smoking Status     Every Day     Packs/day: 0.75     Years: 8.00     Types: Cigarettes   Smokeless Tobacco     Never       Labs:  Lab Results   Component Value Date    A1C 6.9 11/27/2023     Lab Results   Component Value Date     09/19/2023     11/02/2021     04/05/2018     Lab Results   Component Value Date     11/27/2023     04/05/2018     HDL Cholesterol   Date Value Ref Range Status   04/05/2018 44 >39 mg/dL Final     Direct Measure HDL   Date Value Ref Range Status   11/27/2023 40 >=40 mg/dL Final   ]  GFR Estimate   Date Value " "Ref Range Status   09/19/2023 >90 >60 mL/min/1.73m2 Final     No results found for: \"GFRESTBLACK\"  Lab Results   Component Value Date    CR 0.76 09/19/2023     No results found for: \"MICROALBUMIN\"    Healthy Eating:  Healthy Eating Assessed Today: Yes  Cultural/Scientologist diet restrictions?: No  Meal planning/habits: Avoiding sweets, Calorie counting, Carb counting, Low carb, Heart healthy, Low salt, Smaller portions, Keeps food records  How many times a week on average do you eat food made away from home (restaurant/take-out)?: 1  Meals include: Breakfast, Dinner  Breakfast: almond milk, zero sugar greek yogurt, 1/2 cup of rolled oats and 1/2 cup of quinua  Lunch: chx sandwich and bun and beans, like chx chili,  Dinner: [id number], meat and veggies, (brussel sprouts and brochile, or tomatoes with oils and vinargaret  Snacks: was having cucs and veggies and nuts  Other: avacado and salad. some salmon, nuts or egg with a pear  Beverages: Water, Tea, Coffee  Has patient met with a dietitian in the past?: No    Being Active:  Being Active Assessed Today: Yes  Exercise:: Yes  Days per week of moderate to strenuous exercise (like a brisk walk): 7  On average, minutes per day of exercise at this level: 60  How intense was your typical exercise? : Moderate (like brisk walking)  Exercise Minutes per Week: 420  Barrier to exercise: Time, Physical limitation    Monitoring:  Monitoring Assessed Today: Yes  Did patient bring glucose meter to appointment? : Yes  Blood Glucose Meter: CGM  Times checking blood sugar at home (number): 5+  Times checking blood sugar at home (per): Day  Blood glucose trend: No change                Taking Medications:  Diabetes Medication(s)       Biguanides       metFORMIN (GLUCOPHAGE XR) 500 MG 24 hr tablet    Take 2 tablets (1,000 mg) by mouth 2 times daily (with meals)            Taking Medication Assessed Today: Yes  Current Treatments: Oral Medication (taken by mouth), Diet  Problems taking " diabetes medications regularly?: No  Diabetes medication side effects?: No    Problem Solving:  Problem Solving Assessed Today: Yes  Is the patient at risk for hypoglycemia?: No  Is the patient at risk for DKA?: No  Does patient have sick day plan for diabetes management?: Yes              Reducing Risks:  Reducing Risks Assessed Today: Yes  Diabetes Risks: Age over 45 years, Hypertriglyceridemia  CAD Risks: Diabetes Mellitus, Dyslipidemia, Obesity, Tobacco exposure, Male sex  Has dilated eye exam at least once a year?: Yes  Sees dentist every 6 months?: Yes  Feet checked by healthcare provider in the last year?: Yes    Healthy Coping:  Emotional response to diabetes: Ready to learn, Acceptance, Confidence diabetes can be controlled  Informal Support system:: Children, Family, Friends, Spouse  Stage of change: ACTION (Actively working towards change)  Patient Activation Measure Survey Score:      9/6/2011     7:00 AM 4/14/2023    12:00 PM   LASHONDA Score (Last Two)   LASHONDA Raw Score 39 40   Activation Score 56.4 100   LASHONDA Level 3 4         Care Plan and Education Provided:  Care Plan: Diabetes   Updates made by Genny Townsend RN since 12/12/2023 12:00 AM        Problem: HbA1C Not In Goal         Goal: Establish Regular Follow-Ups with PCP    Start Date: 12/12/2023   Recent Progress: 100%        Goal: Get HbA1C Level in Goal    Start Date: 12/12/2023   Recent Progress: 100%        Problem: Diabetes Self-Management Education Needed to Optimize Self-Care Behaviors         Goal: Understand diabetes pathophysiology and disease progression    Start Date: 12/12/2023   This Visit's Progress: 100%   Recent Progress: 90%        Goal: Healthy Eating - follow a healthy eating pattern for diabetes    Start Date: 12/12/2023   This Visit's Progress: 100%   Recent Progress: 90%        Goal: Being Active - get regular physical activity, working up to at least 150 minutes per week    Start Date: 12/12/2023   Recent Progress: 100%         Goal: Monitoring - monitor glucose and ketones as directed    Start Date: 12/12/2023   Recent Progress: 100%        Goal: Taking Medication - patient is consistently taking medications as directed    Start Date: 12/12/2023   Recent Progress: 100%        Goal: Problem Solving - know how to prevent and manage short-term diabetes complications    Start Date: 12/12/2023   This Visit's Progress: 100%   Recent Progress: 80%        Goal: Reducing Risks - know how to prevent and treat long-term diabetes complications    Start Date: 12/12/2023   This Visit's Progress: 100%   Recent Progress: 90%        Goal: Healthy Coping - use available resources to cope with the challenges of managing diabetes    Start Date: 12/12/2023   Recent Progress: 100%          Radha Townsend RN/MARIANO [facility] Diabetes Educator      Time Spent: 60 minutes  Encounter Type: Individual    Any diabetes medication dose changes were made via the CDE Protocol per the patient's primary care provider. A copy of this encounter was shared with the provider.

## 2024-03-01 ENCOUNTER — ALLIED HEALTH/NURSE VISIT (OUTPATIENT)
Dept: EDUCATION SERVICES | Facility: CLINIC | Age: 49
End: 2024-03-01
Payer: COMMERCIAL

## 2024-03-01 DIAGNOSIS — E66.812 CLASS 2 SEVERE OBESITY DUE TO EXCESS CALORIES WITH SERIOUS COMORBIDITY AND BODY MASS INDEX (BMI) OF 37.0 TO 37.9 IN ADULT (H): Primary | ICD-10-CM

## 2024-03-01 DIAGNOSIS — E66.01 CLASS 2 SEVERE OBESITY DUE TO EXCESS CALORIES WITH SERIOUS COMORBIDITY AND BODY MASS INDEX (BMI) OF 37.0 TO 37.9 IN ADULT (H): Primary | ICD-10-CM

## 2024-03-01 DIAGNOSIS — E11.9 TYPE 2 DIABETES MELLITUS WITHOUT COMPLICATION, WITHOUT LONG-TERM CURRENT USE OF INSULIN (H): ICD-10-CM

## 2024-03-01 PROCEDURE — G0108 DIAB MANAGE TRN  PER INDIV: HCPCS | Mod: AE

## 2024-03-01 NOTE — PROGRESS NOTES
Diabetes Self-Management Education & Support    Presents for: Follow-up    Type of Service: In Person Visit      ASSESSMENT:  Alberto is here and he has begun an extensive workout plan, he always gains weight when he does this and then eventually his weight falls off. He eats healthy and has made some goals for himself,  he is not a fan of medications and I respect that, but continue to discuss the disease process. Great patient to work with and he really has great insight to his diabetes and his body    Patient's most recent   Lab Results   Component Value Date    A1C 6.9 11/27/2023     is meeting goal of <7.0    Diabetes knowledge and skills assessment:   Patient is knowledgeable in diabetes management concepts related to: Healthy Eating, Being Active, Monitoring, Taking Medication, Problem Solving, Reducing Risks, and Healthy Coping    Continue education with the following diabetes management concepts: Monitoring and Taking Medication    Based on learning assessment above, most appropriate setting for further diabetes education would be: Individual setting.      PLAN    Target of 6.1 % A1C cut back on Metformin or stop for 3 months and recheck A1C.    Target goal of weight 220 lbs over 3 months time  Continue to exercise as you are doing  C-peptide lab draw will tell if your pancrease is putting out your own insulin.     Topics to cover at upcoming visits: Monitoring and Taking Medication    Follow-up:     See Care Plan for co-developed, patient-state behavior change goals.  AVS provided for patient today.    Education Materials Provided:  No new materials provided today      SUBJECTIVE/OBJECTIVE:  Presents for: Follow-up  Accompanied by: Self  Diabetes education in the past 24mo: No  Focus of Visit: Diabetes Pathophysiology, Being Active, Assistance w/ making life changes, Healthy Eating, Taking Medication  Diabetes type: Type 2  Disease course: Improving  How confident are you filling out medical forms by  "yourself:: Extremely  Transportation concerns: No  Difficulty affording diabetes medication?: No  Difficulty affording diabetes testing supplies?: No  Other concerns:: None  Cultural Influences/Ethnic Background:  Choose not to answer      Diabetes Symptoms & Complications:  Weight trend: Decreasing  Symptom course: Improving  Disease course: Improving  Autonomic neuropathy: No  CVA: No  Heart disease: No  Nephropathy: No  Peripheral neuropathy: No  Peripheral Vascular Disease: No  Retinopathy: No  Sexual dysfunction: Other    Patient Problem List and Family Medical History reviewed for relevant medical history, current medical status, and diabetes risk factors.    Vitals:  There were no vitals taken for this visit.  Estimated body mass index is 37.75 kg/m  as calculated from the following:    Height as of 11/27/23: 1.702 m (5' 7\").    Weight as of 11/27/23: 109.3 kg (241 lb).   Last 3 BP:   BP Readings from Last 3 Encounters:   11/27/23 112/72   09/19/23 110/73   05/18/23 (!) 166/95       History   Smoking Status    Every Day    Packs/day: 0.75    Years: 8.00    Types: Cigarettes   Smokeless Tobacco    Never       Labs:  Lab Results   Component Value Date    A1C 6.9 11/27/2023     Lab Results   Component Value Date     09/19/2023     11/02/2021     04/05/2018     Lab Results   Component Value Date     11/27/2023     04/05/2018     HDL Cholesterol   Date Value Ref Range Status   04/05/2018 44 >39 mg/dL Final     Direct Measure HDL   Date Value Ref Range Status   11/27/2023 40 >=40 mg/dL Final   ]  GFR Estimate   Date Value Ref Range Status   09/19/2023 >90 >60 mL/min/1.73m2 Final     No results found for: \"GFRESTBLACK\"  Lab Results   Component Value Date    CR 0.76 09/19/2023     No results found for: \"MICROALBUMIN\"    Healthy Eating:  Healthy Eating Assessed Today: Yes  Cultural/Restorationism diet restrictions?: No  Meal planning/habits: Avoiding sweets, Calorie counting, Carb " counting, Low carb, Heart healthy, Low salt, Smaller portions, Keeps food records  How many times a week on average do you eat food made away from home (restaurant/take-out)?: 1  Meals include: Breakfast, Dinner  Breakfast: almond milk, zero sugar greek yogurt, 1/2 cup of rolled oats and 1/2 cup of quinua  Lunch: chx sandwich and bun and beans, like chx chili,  Dinner: [id number], meat and veggies, (brussel sprouts and brochile, or tomatoes with oils and vinargaret  Snacks: was having cucs and veggies and nuts  Other: avacado and salad. some salmon, nuts or egg with a pear  Beverages: Water, Tea, Coffee  Has patient met with a dietitian in the past?: No    Being Active:  Being Active Assessed Today: Yes  Exercise:: Yes  Days per week of moderate to strenuous exercise (like a brisk walk): 4  On average, minutes per day of exercise at this level: 60  How intense was your typical exercise? : Moderate (like brisk walking)  Exercise Minutes per Week: 240  Barrier to exercise: Time, Physical limitation    Monitoring:  Monitoring Assessed Today: Yes  Did patient bring glucose meter to appointment? : Yes  Blood Glucose Meter: CGM  Times checking blood sugar at home (number): 5+  Times checking blood sugar at home (per): Day  Blood glucose trend: No change        Taking Medications:  Diabetes Medication(s)       Biguanides       metFORMIN (GLUCOPHAGE XR) 500 MG 24 hr tablet Take 2 tablets (1,000 mg) by mouth 2 times daily (with meals)            Taking Medication Assessed Today: Yes  Current Treatments: Oral Medication (taken by mouth), Diet  Problems taking diabetes medications regularly?: No  Diabetes medication side effects?: No    Problem Solving:  Problem Solving Assessed Today: Yes  Is the patient at risk for hypoglycemia?: No  Is the patient at risk for DKA?: No  Does patient have sick day plan for diabetes management?: Yes              Reducing Risks:  Reducing Risks Assessed Today: Yes  Diabetes Risks: Age over 45  years, Hypertriglyceridemia  CAD Risks: Diabetes Mellitus, Dyslipidemia, Obesity, Tobacco exposure, Male sex  Has dilated eye exam at least once a year?: Yes  Sees dentist every 6 months?: Yes  Feet checked by healthcare provider in the last year?: Yes    Healthy Coping:  Emotional response to diabetes: Ready to learn, Acceptance, Confidence diabetes can be controlled  Informal Support system:: Children, Family, Friends, Spouse  Stage of change: ACTION (Actively working towards change)  Patient Activation Measure Survey Score:      9/6/2011     7:00 AM 4/14/2023    12:00 PM   LASHONDA Score (Last Two)   LASHONDA Raw Score 39 40   Activation Score 56.4 100   LASHONDA Level 3 4         Care Plan and Education Provided:      Radha Townsend RN/MARIANO  [place] Diabetes Educator      Time Spent: 30 minutes  Encounter Type: Individual    Any diabetes medication dose changes were made via the CDE Protocol per the patient's primary care provider. A copy of this encounter was shared with the provider.

## 2024-03-01 NOTE — PATIENT INSTRUCTIONS
Diabetes Support Resources:  Target of 6.1 % A1C cut back on Metformin or stop for 3 months and recheck A1C.    Target goal of weight 220 lbs over 3 months time  Continue to exercise as you are doing  C-peptide lab draw will tell if your pancrease is putting out your own insulin.      Bring blood glucose meter and logbook with you to all doctor and follow-up appointments.    Diabetes Education Telephone Visit Follow-up:    We realize your time is valuable and your health is important! We offer a convenient Telephone Visit follow up! It s a quick way to check in for a medication dose adjustment without having to come back to clinic as soon.    Telephone Visits are often covered by insurance. Please check with your insurance plan to see if this type of visit is covered. If not, the cost is less expensive than an office visit:    Up to 10 minutes (Code 47867): $30  11-20 minutes (Code 95134): $59  More than 20 minutes (Code 79989): $85    Talk with your Diabetes Educator if you want to learn more.      [facility]:  For Your Diabetes Education and Nutrition Appointments Call:  [phone]   For Diabetes Education or Nutrition Related Questions:   Phone: [phone]  Send PrivateCore Message   If you need a medication refill please contact your pharmacy. Please allow 3 business days for your refills to be completed.

## 2024-03-01 NOTE — LETTER
3/1/2024         RE: Alberto Fuentes  [address] [place] [address]        Dear Colleague,    Thank you for referring your patient, Alberto Fuentes, to the [facility]. Please see a copy of my visit note below.    Diabetes Self-Management Education & Support    Presents for: Follow-up    Type of Service: In Person Visit      ASSESSMENT:  Alberto is here and he has begun an extensive workout plan, he always gains weight when he does this and then eventually his weight falls off. He eats healthy and has made some goals for himself,  he is not a fan of medications and I respect that, but continue to discuss the disease process. Great patient to work with and he really has great insight to his diabetes and his body    Patient's most recent   Lab Results   Component Value Date    A1C 6.9 11/27/2023     is meeting goal of <7.0    Diabetes knowledge and skills assessment:   Patient is knowledgeable in diabetes management concepts related to: Healthy Eating, Being Active, Monitoring, Taking Medication, Problem Solving, Reducing Risks, and Healthy Coping    Continue education with the following diabetes management concepts: Monitoring and Taking Medication    Based on learning assessment above, most appropriate setting for further diabetes education would be: Individual setting.      PLAN    Target of 6.1 % A1C cut back on Metformin or stop for 3 months and recheck A1C.    Target goal of weight 220 lbs over 3 months time  Continue to exercise as you are doing  C-peptide lab draw will tell if your pancrease is putting out your own insulin.     Topics to cover at upcoming visits: Monitoring and Taking Medication    Follow-up:     See Care Plan for co-developed, patient-state behavior change goals.  AVS provided for patient today.    Education Materials Provided:  No new materials provided today      SUBJECTIVE/OBJECTIVE:  Presents for: Follow-up  Accompanied by: Self  Diabetes education in the  "past 24mo: No  Focus of Visit: Diabetes Pathophysiology, Being Active, Assistance w/ making life changes, Healthy Eating, Taking Medication  Diabetes type: Type 2  Disease course: Improving  How confident are you filling out medical forms by yourself:: Extremely  Transportation concerns: No  Difficulty affording diabetes medication?: No  Difficulty affording diabetes testing supplies?: No  Other concerns:: None  Cultural Influences/Ethnic Background:  Choose not to answer      Diabetes Symptoms & Complications:  Weight trend: Decreasing  Symptom course: Improving  Disease course: Improving  Autonomic neuropathy: No  CVA: No  Heart disease: No  Nephropathy: No  Peripheral neuropathy: No  Peripheral Vascular Disease: No  Retinopathy: No  Sexual dysfunction: Other    Patient Problem List and Family Medical History reviewed for relevant medical history, current medical status, and diabetes risk factors.    Vitals:  There were no vitals taken for this visit.  Estimated body mass index is 37.75 kg/m  as calculated from the following:    Height as of 11/27/23: 1.702 m (5' 7\").    Weight as of 11/27/23: 109.3 kg (241 lb).   Last 3 BP:   BP Readings from Last 3 Encounters:   11/27/23 112/72   09/19/23 110/73   05/18/23 (!) 166/95       History   Smoking Status     Every Day     Packs/day: 0.75     Years: 8.00     Types: Cigarettes   Smokeless Tobacco     Never       Labs:  Lab Results   Component Value Date    A1C 6.9 11/27/2023     Lab Results   Component Value Date     09/19/2023     11/02/2021     04/05/2018     Lab Results   Component Value Date     11/27/2023     04/05/2018     HDL Cholesterol   Date Value Ref Range Status   04/05/2018 44 >39 mg/dL Final     Direct Measure HDL   Date Value Ref Range Status   11/27/2023 40 >=40 mg/dL Final   ]  GFR Estimate   Date Value Ref Range Status   09/19/2023 >90 >60 mL/min/1.73m2 Final     No results found for: \"GFRESTBLACK\"  Lab Results " "  Component Value Date    CR 0.76 09/19/2023     No results found for: \"MICROALBUMIN\"    Healthy Eating:  Healthy Eating Assessed Today: Yes  Cultural/Presybeterian diet restrictions?: No  Meal planning/habits: Avoiding sweets, Calorie counting, Carb counting, Low carb, Heart healthy, Low salt, Smaller portions, Keeps food records  How many times a week on average do you eat food made away from home (restaurant/take-out)?: 1  Meals include: Breakfast, Dinner  Breakfast: almond milk, zero sugar greek yogurt, 1/2 cup of rolled oats and 1/2 cup of quinua  Lunch: chx sandwich and bun and beans, like chx chili,  Dinner: [id number], meat and veggies, (brussel sprouts and brochile, or tomatoes with oils and vinargaret  Snacks: was having cucs and veggies and nuts  Other: avacado and salad. some salmon, nuts or egg with a pear  Beverages: Water, Tea, Coffee  Has patient met with a dietitian in the past?: No    Being Active:  Being Active Assessed Today: Yes  Exercise:: Yes  Days per week of moderate to strenuous exercise (like a brisk walk): 4  On average, minutes per day of exercise at this level: 60  How intense was your typical exercise? : Moderate (like brisk walking)  Exercise Minutes per Week: 240  Barrier to exercise: Time, Physical limitation    Monitoring:  Monitoring Assessed Today: Yes  Did patient bring glucose meter to appointment? : Yes  Blood Glucose Meter: CGM  Times checking blood sugar at home (number): 5+  Times checking blood sugar at home (per): Day  Blood glucose trend: No change        Taking Medications:  Diabetes Medication(s)       Biguanides       metFORMIN (GLUCOPHAGE XR) 500 MG 24 hr tablet Take 2 tablets (1,000 mg) by mouth 2 times daily (with meals)            Taking Medication Assessed Today: Yes  Current Treatments: Oral Medication (taken by mouth), Diet  Problems taking diabetes medications regularly?: No  Diabetes medication side effects?: No    Problem Solving:  Problem Solving Assessed Today: " Yes  Is the patient at risk for hypoglycemia?: No  Is the patient at risk for DKA?: No  Does patient have sick day plan for diabetes management?: Yes              Reducing Risks:  Reducing Risks Assessed Today: Yes  Diabetes Risks: Age over 45 years, Hypertriglyceridemia  CAD Risks: Diabetes Mellitus, Dyslipidemia, Obesity, Tobacco exposure, Male sex  Has dilated eye exam at least once a year?: Yes  Sees dentist every 6 months?: Yes  Feet checked by healthcare provider in the last year?: Yes    Healthy Coping:  Emotional response to diabetes: Ready to learn, Acceptance, Confidence diabetes can be controlled  Informal Support system:: Children, Family, Friends, Spouse  Stage of change: ACTION (Actively working towards change)  Patient Activation Measure Survey Score:      9/6/2011     7:00 AM 4/14/2023    12:00 PM   LASHONDA Score (Last Two)   LASHONDA Raw Score 39 40   Activation Score 56.4 100   LSAHONDA Level 3 4         Care Plan and Education Provided:      Radha Townsend RN/MARIANO Chowdary Diabetes Educator      Time Spent: 30 minutes  Encounter Type: Individual    Any diabetes medication dose changes were made via the CDE Protocol per the patient's primary care provider. A copy of this encounter was shared with the provider.

## 2024-03-09 ENCOUNTER — MYC REFILL (OUTPATIENT)
Dept: FAMILY MEDICINE | Facility: CLINIC | Age: 49
End: 2024-03-09
Payer: COMMERCIAL

## 2024-03-09 DIAGNOSIS — N52.9 ERECTILE DYSFUNCTION, UNSPECIFIED ERECTILE DYSFUNCTION TYPE: ICD-10-CM

## 2024-03-11 RX ORDER — SILDENAFIL CITRATE 20 MG/1
TABLET ORAL
Qty: 30 TABLET | Refills: 2 | Status: SHIPPED | OUTPATIENT
Start: 2024-03-11

## 2024-04-21 ENCOUNTER — HEALTH MAINTENANCE LETTER (OUTPATIENT)
Age: 49
End: 2024-04-21

## 2024-05-03 ENCOUNTER — PATIENT OUTREACH (OUTPATIENT)
Dept: FAMILY MEDICINE | Facility: CLINIC | Age: 49
End: 2024-05-03
Payer: COMMERCIAL

## 2024-05-03 ENCOUNTER — MYC MEDICAL ADVICE (OUTPATIENT)
Dept: FAMILY MEDICINE | Facility: CLINIC | Age: 49
End: 2024-05-03
Payer: COMMERCIAL

## 2024-05-03 NOTE — TELEPHONE ENCOUNTER
Patient Quality Outreach    Patient is due for the following:   Diabetes -  A1C and Microalbumin  Colon Cancer Screening    Next Steps:   Schedule a lab only visit for A1C    Type of outreach:    Sent [employer] message.      Questions for provider review:    None           Gretchen Soni, CMA

## 2024-05-31 ENCOUNTER — DOCUMENTATION ONLY (OUTPATIENT)
Dept: LAB | Facility: CLINIC | Age: 49
End: 2024-05-31

## 2024-05-31 ENCOUNTER — LAB (OUTPATIENT)
Dept: LAB | Facility: CLINIC | Age: 49
End: 2024-05-31
Payer: COMMERCIAL

## 2024-05-31 DIAGNOSIS — E78.1 HYPERTRIGLYCERIDEMIA: Primary | ICD-10-CM

## 2024-05-31 DIAGNOSIS — E11.9 DIABETES MELLITUS, TYPE 2 (H): Primary | ICD-10-CM

## 2024-05-31 DIAGNOSIS — E78.1 HYPERTRIGLYCERIDEMIA: ICD-10-CM

## 2024-05-31 LAB
HBA1C MFR BLD: 7 % (ref 0–5.6)
HOLD SPECIMEN: NORMAL

## 2024-05-31 PROCEDURE — 36415 COLL VENOUS BLD VENIPUNCTURE: CPT

## 2024-05-31 PROCEDURE — 80061 LIPID PANEL: CPT

## 2024-05-31 PROCEDURE — 83036 HEMOGLOBIN GLYCOSYLATED A1C: CPT

## 2024-05-31 NOTE — PROGRESS NOTES
Patient states he needs a lipid panel and he is fasting. Blood has been drawn and the lab is holding it.  Please review and place orders ASAP due to sample stability.    Thank you, Anita Mahan MLT

## 2024-05-31 NOTE — ADDENDUM NOTE
Addended by: IRMA ASHBY on: 5/31/2024 07:22 AM     Modules accepted: Orders    
normal/no JVD/supple

## 2024-06-01 LAB
CHOLEST SERPL-MCNC: 165 MG/DL
HDLC SERPL-MCNC: 33 MG/DL
LDLC SERPL CALC-MCNC: 93 MG/DL
NONHDLC SERPL-MCNC: 132 MG/DL
TRIGL SERPL-MCNC: 194 MG/DL

## 2024-06-04 DIAGNOSIS — E11.9 TYPE 2 DIABETES MELLITUS WITHOUT COMPLICATION, WITHOUT LONG-TERM CURRENT USE OF INSULIN (H): ICD-10-CM

## 2024-06-04 RX ORDER — METFORMIN HCL 500 MG
1000 TABLET, EXTENDED RELEASE 24 HR ORAL 2 TIMES DAILY WITH MEALS
Qty: 360 TABLET | Refills: 1 | Status: SHIPPED | OUTPATIENT
Start: 2024-06-04 | End: 2024-09-17

## 2024-06-16 ENCOUNTER — MYC REFILL (OUTPATIENT)
Dept: FAMILY MEDICINE | Facility: CLINIC | Age: 49
End: 2024-06-16

## 2024-06-16 ENCOUNTER — MYC REFILL (OUTPATIENT)
Dept: FAMILY MEDICINE | Facility: CLINIC | Age: 49
End: 2024-06-16
Payer: COMMERCIAL

## 2024-06-16 DIAGNOSIS — E11.9 TYPE 2 DIABETES MELLITUS WITHOUT COMPLICATION, WITHOUT LONG-TERM CURRENT USE OF INSULIN (H): ICD-10-CM

## 2024-06-17 RX ORDER — METFORMIN HCL 500 MG
1000 TABLET, EXTENDED RELEASE 24 HR ORAL 2 TIMES DAILY WITH MEALS
Qty: 360 TABLET | Refills: 1 | OUTPATIENT
Start: 2024-06-17

## 2024-06-17 RX ORDER — ATORVASTATIN CALCIUM 20 MG/1
20 TABLET, FILM COATED ORAL DAILY
Qty: 90 TABLET | Refills: 0 | Status: SHIPPED | OUTPATIENT
Start: 2024-06-17 | End: 2024-09-09

## 2024-06-21 ENCOUNTER — ALLIED HEALTH/NURSE VISIT (OUTPATIENT)
Dept: EDUCATION SERVICES | Facility: CLINIC | Age: 49
End: 2024-06-21
Payer: COMMERCIAL

## 2024-06-21 DIAGNOSIS — E11.9 TYPE 2 DIABETES MELLITUS WITHOUT COMPLICATION, WITHOUT LONG-TERM CURRENT USE OF INSULIN (H): Primary | ICD-10-CM

## 2024-06-21 PROCEDURE — G0108 DIAB MANAGE TRN  PER INDIV: HCPCS

## 2024-06-21 PROCEDURE — 99207 PR NO CHARGE NURSE ONLY: CPT

## 2024-06-21 NOTE — PATIENT INSTRUCTIONS
Diabetes Support Resources:  Continuing with the meal planning as you have been.    Ugh   weight loss is so tough.    1700 calories actually sounds a little low for a male who is active   what is height, weight, age.  OK to up calories a slight amount from healthy sources (fruits, veggies, protein).  Adding in resistance training / muscle building to change body composition (focus on muscle growth and less on the number) .  Being patient through weight plateaus.  Eventually it will .  SLOW weight loss is way better in terms of weight loss maintenance / be patient.   Metformin  mg taking 2 pills twice per day.  Do some research on the GLP-1 (gut hormone with diabetes)    Follow up as needed [phone]     Bring blood glucose meter and logbook with you to all doctor and follow-up appointments.    Diabetes Education Telephone Visit Follow-up:    We realize your time is valuable and your health is important! We offer a convenient Telephone Visit follow up! It s a quick way to check in for a medication dose adjustment without having to come back to clinic as soon.    Telephone Visits are often covered by insurance. Please check with your insurance plan to see if this type of visit is covered. If not, the cost is less expensive than an office visit:    Up to 10 minutes (Code 69937): $30  11-20 minutes (Code 83639): $59  More than 20 minutes (Code 26953): $85    Talk with your Diabetes Educator if you want to learn more.      [facility]:  For Your Diabetes Education and Nutrition Appointments Call:  [phone]   For Diabetes Education or Nutrition Related Questions:   Phone: [phone]  Send [employer] Message   If you need a medication refill please contact your pharmacy. Please allow 3 business days for your refills to be completed.

## 2024-06-21 NOTE — PROGRESS NOTES
Diabetes Self-Management Education & Support    Presents for: Follow-up    Type of Service: In Person Visit      ASSESSMENT:  Alberto is working hard at not only getting his BG in control , but trying to lose weight.  He has been trying to keep his calories to  per day.  We did discuss getting a few more in for him.  He does run and work out at home,  eats very healthy, but I am concerned he may not be reaching his carb goal each day and maybe having a bigger meal later in the day.  Bedtime snack can be hit or miss.  Has increased his fruits and veggies now for the calories and carbs.  Would like to get down to a 34 inch waist.  Slow but surely. I did present GLP-1 again to him to help manage his diabetes.  He is willing to look into it, but for now only taking Metformin.  Does take a statin as well.     Patient's most recent   Lab Results   Component Value Date    A1C 7.0 05/31/2024     is meeting goal of <7.0    Diabetes knowledge and skills assessment:   Patient is knowledgeable in diabetes management concepts related to: Healthy Eating, Being Active, Monitoring, Taking Medication, Problem Solving, and Reducing Risks    Continue education with the following diabetes management concepts: Healthy Eating, Monitoring, Taking Medication, and Problem Solving    Based on learning assessment above, most appropriate setting for further diabetes education would be: Individual setting.      PLAN  Continuing with the meal planning as you have been.    Ugh   weight loss is so tough.    1700 calories actually sounds a little low for a male who is active   what is height, weight, age.  OK to up calories a slight amount from healthy sources (fruits, veggies, protein).  Adding in resistance training / muscle building to change body composition (focus on muscle growth and less on the number) .  Being patient through weight plateaus.  Eventually it will .  SLOW weight loss is way better in terms of weight loss  "maintenance / be patient.   Metformin  mg taking 2 pills twice per day.  Do some research on the GLP-1 (gut hormone with diabetes)    Follow up as needed [phone]       Topics to cover at upcoming visits: Healthy Eating, Being Active, Monitoring, Taking Medication, and Problem Solving    Follow-up: as needed    See Care Plan for co-developed, patient-state behavior change goals.  AVS provided for patient today.    Education Materials Provided:  Medication Information on GLP-1      SUBJECTIVE/OBJECTIVE:  Presents for: Follow-up  Accompanied by: Self  Diabetes education in the past 24mo: Yes  Focus of Visit: Diabetes Pathophysiology, Being Active, Assistance w/ making life changes, Healthy Eating, Taking Medication  Diabetes type: Type 2  Disease course: Improving  How confident are you filling out medical forms by yourself:: Extremely  Transportation concerns: No  Difficulty affording diabetes medication?: No  Difficulty affording diabetes testing supplies?: No  Other concerns:: None  Cultural Influences/Ethnic Background:  Choose not to answer      Diabetes Symptoms & Complications:  Weight trend: Decreasing  Symptom course: Improving  Disease course: Improving  Autonomic neuropathy: No  CVA: No  Heart disease: No  Nephropathy: No  Peripheral neuropathy: No  Peripheral Vascular Disease: No  Retinopathy: No  Sexual dysfunction: Other    Patient Problem List and Family Medical History reviewed for relevant medical history, current medical status, and diabetes risk factors.    Vitals:  There were no vitals taken for this visit.  Estimated body mass index is 37.75 kg/m  as calculated from the following:    Height as of 11/27/23: 1.702 m (5' 7\").    Weight as of 11/27/23: 109.3 kg (241 lb).   Last 3 BP:   BP Readings from Last 3 Encounters:   11/27/23 112/72   09/19/23 110/73   05/18/23 (!) 166/95       History   Smoking Status    Every Day    Packs/day: 0.75    Years: 8.00    Types: Cigarettes   Smokeless " "Tobacco    Never       Labs:  Lab Results   Component Value Date    A1C 7.0 05/31/2024     Lab Results   Component Value Date     09/19/2023     11/02/2021     04/05/2018     Lab Results   Component Value Date    LDL 93 05/31/2024     04/05/2018     HDL Cholesterol   Date Value Ref Range Status   04/05/2018 44 >39 mg/dL Final     Direct Measure HDL   Date Value Ref Range Status   05/31/2024 33 (L) >=40 mg/dL Final   ]  GFR Estimate   Date Value Ref Range Status   09/19/2023 >90 >60 mL/min/1.73m2 Final     No results found for: \"GFRESTBLACK\"  Lab Results   Component Value Date    CR 0.76 09/19/2023     No results found for: \"MICROALBUMIN\"    Healthy Eating:  Healthy Eating Assessed Today: Yes  Cultural/Gnosticist diet restrictions?: No  Meal planning/habits: Avoiding sweets, Calorie counting, Carb counting, Low carb, Heart healthy, Low salt, Smaller portions, Keeps food records  Who cooks/prepares meals for you?: Self  Who purchases food in  your home?: Self, Spouse  How many times a week on average do you eat food made away from home (restaurant/take-out)?: 1  Meals include: Breakfast, Dinner  Breakfast: almond milk, zero sugar greek yogurt, 1/2 cup of rolled oats and 1/2 cup of quinua  Lunch: chx sandwich and bun and beans, like chx chili,  Dinner: [id number], meat and veggies, (brussel sprouts and brochile, or tomatoes with oils and vinargaret  Snacks: was having cucs and veggies and nuts  Other: avacado and salad. some salmon, nuts or egg with a pear  Beverages: Water, Tea, Coffee  Has patient met with a dietitian in the past?: No    Being Active:  Being Active Assessed Today: Yes  Exercise:: Yes  Days per week of moderate to strenuous exercise (like a brisk walk): 4  On average, minutes per day of exercise at this level: 60  How intense was your typical exercise? : Moderate (like brisk walking)  Exercise Minutes per Week: 240  Barrier to exercise: Time, Physical " limitation    Monitoring:  Monitoring Assessed Today: Yes  Did patient bring glucose meter to appointment? : Yes  Blood Glucose Meter: CGM  Times checking blood sugar at home (number): 5+  Times checking blood sugar at home (per): Day  Blood glucose trend: No change                Taking Medications:  Diabetes Medication(s)       Biguanides       metFORMIN (GLUCOPHAGE XR) 500 MG 24 hr tablet TAKE 2 TABLETS (1,000 MG) BY MOUTH 2 TIMES DAILY (WITH MEALS)            Taking Medication Assessed Today: Yes  Current Treatments: Oral Medication (taken by mouth), Diet  Problems taking diabetes medications regularly?: No  Diabetes medication side effects?: No    Problem Solving:  Problem Solving Assessed Today: Yes  Is the patient at risk for hypoglycemia?: No  Is the patient at risk for DKA?: No  Does patient have sick day plan for diabetes management?: Yes              Reducing Risks:  Reducing Risks Assessed Today: Yes  Diabetes Risks: Age over 45 years, Hypertriglyceridemia  CAD Risks: Diabetes Mellitus, Dyslipidemia, Obesity, Tobacco exposure, Male sex  Has dilated eye exam at least once a year?: Yes  Sees dentist every 6 months?: Yes  Feet checked by healthcare provider in the last year?: Yes    Healthy Coping:  Emotional response to diabetes: Ready to learn, Acceptance, Confidence diabetes can be controlled  Informal Support system:: Children, Family, Friends, Spouse  Stage of change: ACTION (Actively working towards change)  Patient Activation Measure Survey Score:      9/6/2011     7:00 AM 4/14/2023    12:00 PM   LASHONDA Score (Last Two)   LASHONDA Raw Score 39 40   Activation Score 56.4 100   LASHONDA Level 3 4         Care Plan and Education Provided:  Healthy Eating: Balanced meals, Carbohydrate Counting, Consistency in amount and timing of carbohydrate intake, Eating out, Plate planning method, Portion control, and Weight Management, Being Active: Amount recommended (150 minutes moderate or 75 minutes vigorous activity and 2-3  days strength training per week) and Finding a physical activity routine that works for you, Monitoring: Blood glucose versus Continuous Glucose Monitoring, Individual glucose targets, and Log and interpret results, Taking Medication: Action of prescribed medication(s), Side effects of prescribed medication(s), and When to take medication(s), and Problem Solving: High glucose - causes, signs/symptoms, treatment and prevention, Low glucose - causes, signs/symptoms, treatment and prevention, Rule of 15 and carrying a carbohydrate source at all times in case of low glucose, and When to call a health care provider    Radha Townsend RN/MARIANO  [place] Diabetes Educator      Time Spent: 30 minutes  Encounter Type: Individual    Any diabetes medication dose changes were made via the CDE Protocol per the patient's primary care provider. A copy of this encounter was shared with the provider.

## 2024-06-21 NOTE — LETTER
6/21/2024         RE: Alberto Fuentes  [address] [place] [address]        Dear Colleague,    Thank you for referring your patient, Alberto Fuentes, to the [facility]. Please see a copy of my visit note below.    Diabetes Self-Management Education & Support    Presents for: Follow-up    Type of Service: In Person Visit      ASSESSMENT:  Alberto is working hard at not only getting his BG in control , but trying to lose weight.  He has been trying to keep his calories to  per day.  We did discuss getting a few more in for him.  He does run and work out at home,  eats very healthy, but I am concerned he may not be reaching his carb goal each day and maybe having a bigger meal later in the day.  Bedtime snack can be hit or miss.  Has increased his fruits and veggies now for the calories and carbs.  Would like to get down to a 34 inch waist.  Slow but surely. I did present GLP-1 again to him to help manage his diabetes.  He is willing to look into it, but for now only taking Metformin.  Does take a statin as well.     Patient's most recent   Lab Results   Component Value Date    A1C 7.0 05/31/2024     is meeting goal of <7.0    Diabetes knowledge and skills assessment:   Patient is knowledgeable in diabetes management concepts related to: Healthy Eating, Being Active, Monitoring, Taking Medication, Problem Solving, and Reducing Risks    Continue education with the following diabetes management concepts: Healthy Eating, Monitoring, Taking Medication, and Problem Solving    Based on learning assessment above, most appropriate setting for further diabetes education would be: Individual setting.      PLAN  Continuing with the meal planning as you have been.    Ugh   weight loss is so tough.    1700 calories actually sounds a little low for a male who is active   what is height, weight, age.  OK to up calories a slight amount from healthy sources (fruits, veggies, protein).  Adding  "in resistance training / muscle building to change body composition (focus on muscle growth and less on the number) .  Being patient through weight plateaus.  Eventually it will .  SLOW weight loss is way better in terms of weight loss maintenance / be patient.   Metformin  mg taking 2 pills twice per day.  Do some research on the GLP-1 (gut hormone with diabetes)    Follow up as needed [phone]       Topics to cover at upcoming visits: Healthy Eating, Being Active, Monitoring, Taking Medication, and Problem Solving    Follow-up: as needed    See Care Plan for co-developed, patient-state behavior change goals.  AVS provided for patient today.    Education Materials Provided:  Medication Information on GLP-1      SUBJECTIVE/OBJECTIVE:  Presents for: Follow-up  Accompanied by: Self  Diabetes education in the past 24mo: Yes  Focus of Visit: Diabetes Pathophysiology, Being Active, Assistance w/ making life changes, Healthy Eating, Taking Medication  Diabetes type: Type 2  Disease course: Improving  How confident are you filling out medical forms by yourself:: Extremely  Transportation concerns: No  Difficulty affording diabetes medication?: No  Difficulty affording diabetes testing supplies?: No  Other concerns:: None  Cultural Influences/Ethnic Background:  Choose not to answer      Diabetes Symptoms & Complications:  Weight trend: Decreasing  Symptom course: Improving  Disease course: Improving  Autonomic neuropathy: No  CVA: No  Heart disease: No  Nephropathy: No  Peripheral neuropathy: No  Peripheral Vascular Disease: No  Retinopathy: No  Sexual dysfunction: Other    Patient Problem List and Family Medical History reviewed for relevant medical history, current medical status, and diabetes risk factors.    Vitals:  There were no vitals taken for this visit.  Estimated body mass index is 37.75 kg/m  as calculated from the following:    Height as of 11/27/23: 1.702 m (5' 7\").    Weight as of 11/27/23: " "109.3 kg (241 lb).   Last 3 BP:   BP Readings from Last 3 Encounters:   11/27/23 112/72   09/19/23 110/73   05/18/23 (!) 166/95       History   Smoking Status     Every Day     Packs/day: 0.75     Years: 8.00     Types: Cigarettes   Smokeless Tobacco     Never       Labs:  Lab Results   Component Value Date    A1C 7.0 05/31/2024     Lab Results   Component Value Date     09/19/2023     11/02/2021     04/05/2018     Lab Results   Component Value Date    LDL 93 05/31/2024     04/05/2018     HDL Cholesterol   Date Value Ref Range Status   04/05/2018 44 >39 mg/dL Final     Direct Measure HDL   Date Value Ref Range Status   05/31/2024 33 (L) >=40 mg/dL Final   ]  GFR Estimate   Date Value Ref Range Status   09/19/2023 >90 >60 mL/min/1.73m2 Final     No results found for: \"GFRESTBLACK\"  Lab Results   Component Value Date    CR 0.76 09/19/2023     No results found for: \"MICROALBUMIN\"    Healthy Eating:  Healthy Eating Assessed Today: Yes  Cultural/Oriental orthodox diet restrictions?: No  Meal planning/habits: Avoiding sweets, Calorie counting, Carb counting, Low carb, Heart healthy, Low salt, Smaller portions, Keeps food records  Who cooks/prepares meals for you?: Self  Who purchases food in  your home?: Self, Spouse  How many times a week on average do you eat food made away from home (restaurant/take-out)?: 1  Meals include: Breakfast, Dinner  Breakfast: almond milk, zero sugar greek yogurt, 1/2 cup of rolled oats and 1/2 cup of quinua  Lunch: chx sandwich and bun and beans, like chx chili,  Dinner: [id number], meat and veggies, (brussel sprouts and brochile, or tomatoes with oils and vinargaret  Snacks: was having cucs and veggies and nuts  Other: avacado and salad. some salmon, nuts or egg with a pear  Beverages: Water, Tea, Coffee  Has patient met with a dietitian in the past?: No    Being Active:  Being Active Assessed Today: Yes  Exercise:: Yes  Days per week of moderate to strenuous exercise " (like a brisk walk): 4  On average, minutes per day of exercise at this level: 60  How intense was your typical exercise? : Moderate (like brisk walking)  Exercise Minutes per Week: 240  Barrier to exercise: Time, Physical limitation    Monitoring:  Monitoring Assessed Today: Yes  Did patient bring glucose meter to appointment? : Yes  Blood Glucose Meter: CGM  Times checking blood sugar at home (number): 5+  Times checking blood sugar at home (per): Day  Blood glucose trend: No change                Taking Medications:  Diabetes Medication(s)       Biguanides       metFORMIN (GLUCOPHAGE XR) 500 MG 24 hr tablet TAKE 2 TABLETS (1,000 MG) BY MOUTH 2 TIMES DAILY (WITH MEALS)            Taking Medication Assessed Today: Yes  Current Treatments: Oral Medication (taken by mouth), Diet  Problems taking diabetes medications regularly?: No  Diabetes medication side effects?: No    Problem Solving:  Problem Solving Assessed Today: Yes  Is the patient at risk for hypoglycemia?: No  Is the patient at risk for DKA?: No  Does patient have sick day plan for diabetes management?: Yes              Reducing Risks:  Reducing Risks Assessed Today: Yes  Diabetes Risks: Age over 45 years, Hypertriglyceridemia  CAD Risks: Diabetes Mellitus, Dyslipidemia, Obesity, Tobacco exposure, Male sex  Has dilated eye exam at least once a year?: Yes  Sees dentist every 6 months?: Yes  Feet checked by healthcare provider in the last year?: Yes    Healthy Coping:  Emotional response to diabetes: Ready to learn, Acceptance, Confidence diabetes can be controlled  Informal Support system:: Children, Family, Friends, Spouse  Stage of change: ACTION (Actively working towards change)  Patient Activation Measure Survey Score:      9/6/2011     7:00 AM 4/14/2023    12:00 PM   LASHONDA Score (Last Two)   LASHONDA Raw Score 39 40   Activation Score 56.4 100   LASHONDA Level 3 4         Care Plan and Education Provided:  Healthy Eating: Balanced meals, Carbohydrate Counting,  Consistency in amount and timing of carbohydrate intake, Eating out, Plate planning method, Portion control, and Weight Management, Being Active: Amount recommended (150 minutes moderate or 75 minutes vigorous activity and 2-3 days strength training per week) and Finding a physical activity routine that works for you, Monitoring: Blood glucose versus Continuous Glucose Monitoring, Individual glucose targets, and Log and interpret results, Taking Medication: Action of prescribed medication(s), Side effects of prescribed medication(s), and When to take medication(s), and Problem Solving: High glucose - causes, signs/symptoms, treatment and prevention, Low glucose - causes, signs/symptoms, treatment and prevention, Rule of 15 and carrying a carbohydrate source at all times in case of low glucose, and When to call a health care provider    Radha Townsend RN/MARIANO Chowdary Diabetes Educator      Time Spent: 30 minutes  Encounter Type: Individual    Any diabetes medication dose changes were made via the CDE Protocol per the patient's primary care provider. A copy of this encounter was shared with the provider.

## 2024-07-25 ENCOUNTER — PATIENT OUTREACH (OUTPATIENT)
Dept: CARE COORDINATION | Facility: CLINIC | Age: 49
End: 2024-07-25
Payer: COMMERCIAL

## 2024-07-30 ENCOUNTER — MEDICAL CORRESPONDENCE (OUTPATIENT)
Dept: HEALTH INFORMATION MANAGEMENT | Facility: CLINIC | Age: 49
End: 2024-07-30
Payer: COMMERCIAL

## 2024-09-06 DIAGNOSIS — E11.9 TYPE 2 DIABETES MELLITUS WITHOUT COMPLICATION, WITHOUT LONG-TERM CURRENT USE OF INSULIN (H): ICD-10-CM

## 2024-09-08 ENCOUNTER — HEALTH MAINTENANCE LETTER (OUTPATIENT)
Age: 49
End: 2024-09-08

## 2024-09-09 RX ORDER — ATORVASTATIN CALCIUM 20 MG/1
20 TABLET, FILM COATED ORAL DAILY
Qty: 90 TABLET | Refills: 0 | Status: SHIPPED | OUTPATIENT
Start: 2024-09-09 | End: 2024-09-17

## 2024-09-17 ENCOUNTER — MYC MEDICAL ADVICE (OUTPATIENT)
Dept: FAMILY MEDICINE | Facility: CLINIC | Age: 49
End: 2024-09-17
Payer: COMMERCIAL

## 2024-09-19 ENCOUNTER — OFFICE VISIT (OUTPATIENT)
Dept: FAMILY MEDICINE | Facility: CLINIC | Age: 49
End: 2024-09-19
Attending: PHYSICIAN ASSISTANT
Payer: COMMERCIAL

## 2024-09-19 VITALS
BODY MASS INDEX: 40.65 KG/M2 | RESPIRATION RATE: 16 BRPM | HEART RATE: 88 BPM | TEMPERATURE: 98 F | DIASTOLIC BLOOD PRESSURE: 72 MMHG | OXYGEN SATURATION: 97 % | WEIGHT: 259 LBS | SYSTOLIC BLOOD PRESSURE: 118 MMHG | HEIGHT: 67 IN

## 2024-09-19 DIAGNOSIS — Z00.00 ROUTINE HISTORY AND PHYSICAL EXAMINATION OF ADULT: Primary | ICD-10-CM

## 2024-09-19 DIAGNOSIS — E66.01 MORBID OBESITY (H): ICD-10-CM

## 2024-09-19 DIAGNOSIS — Z12.11 SCREEN FOR COLON CANCER: ICD-10-CM

## 2024-09-19 DIAGNOSIS — E78.1 HYPERTRIGLYCERIDEMIA: ICD-10-CM

## 2024-09-19 DIAGNOSIS — E11.9 TYPE 2 DIABETES MELLITUS WITHOUT COMPLICATION, WITHOUT LONG-TERM CURRENT USE OF INSULIN (H): ICD-10-CM

## 2024-09-19 LAB
ANION GAP SERPL CALCULATED.3IONS-SCNC: 10 MMOL/L (ref 7–15)
BUN SERPL-MCNC: 16 MG/DL (ref 6–20)
CALCIUM SERPL-MCNC: 9.1 MG/DL (ref 8.8–10.4)
CHLORIDE SERPL-SCNC: 104 MMOL/L (ref 98–107)
CREAT SERPL-MCNC: 0.92 MG/DL (ref 0.67–1.17)
CREAT UR-MCNC: 134 MG/DL
EGFRCR SERPLBLD CKD-EPI 2021: >90 ML/MIN/1.73M2
EST. AVERAGE GLUCOSE BLD GHB EST-MCNC: 160 MG/DL
GLUCOSE SERPL-MCNC: 167 MG/DL (ref 70–99)
HBA1C MFR BLD: 7.2 % (ref 0–5.6)
HCO3 SERPL-SCNC: 26 MMOL/L (ref 22–29)
MICROALBUMIN UR-MCNC: <12 MG/L
MICROALBUMIN/CREAT UR: NORMAL MG/G{CREAT}
POTASSIUM SERPL-SCNC: 4.5 MMOL/L (ref 3.4–5.3)
SODIUM SERPL-SCNC: 140 MMOL/L (ref 135–145)
TSH SERPL DL<=0.005 MIU/L-ACNC: 1.29 UIU/ML (ref 0.3–4.2)

## 2024-09-19 PROCEDURE — 36415 COLL VENOUS BLD VENIPUNCTURE: CPT | Performed by: FAMILY MEDICINE

## 2024-09-19 PROCEDURE — 99396 PREV VISIT EST AGE 40-64: CPT | Performed by: FAMILY MEDICINE

## 2024-09-19 PROCEDURE — 82570 ASSAY OF URINE CREATININE: CPT | Performed by: FAMILY MEDICINE

## 2024-09-19 PROCEDURE — 83036 HEMOGLOBIN GLYCOSYLATED A1C: CPT | Performed by: FAMILY MEDICINE

## 2024-09-19 PROCEDURE — 80048 BASIC METABOLIC PNL TOTAL CA: CPT | Performed by: FAMILY MEDICINE

## 2024-09-19 PROCEDURE — 99214 OFFICE O/P EST MOD 30 MIN: CPT | Mod: 25 | Performed by: FAMILY MEDICINE

## 2024-09-19 PROCEDURE — 84443 ASSAY THYROID STIM HORMONE: CPT | Performed by: FAMILY MEDICINE

## 2024-09-19 PROCEDURE — 82043 UR ALBUMIN QUANTITATIVE: CPT | Performed by: FAMILY MEDICINE

## 2024-09-19 RX ORDER — METFORMIN HCL 500 MG
1000 TABLET, EXTENDED RELEASE 24 HR ORAL 2 TIMES DAILY WITH MEALS
Qty: 360 TABLET | Refills: 1 | Status: SHIPPED | OUTPATIENT
Start: 2024-09-19

## 2024-09-19 RX ORDER — ATORVASTATIN CALCIUM 20 MG/1
20 TABLET, FILM COATED ORAL DAILY
Qty: 90 TABLET | Refills: 3 | Status: SHIPPED | OUTPATIENT
Start: 2024-09-19

## 2024-09-19 ASSESSMENT — PAIN SCALES - GENERAL: PAINLEVEL: NO PAIN (0)

## 2024-09-19 NOTE — PROGRESS NOTES
"Preventive Care Visit  [facility]  Krupa Zayas MD, Family Medicine  Sep 19, 2024      Assessment & Plan     Routine history and physical examination of adult      Type 2 diabetes mellitus without complication, without long-term current use of insulin (H)  On metformin  Discussed adding jardiance and/or ozempic  On aspirin and statin  - atorvastatin (LIPITOR) 20 MG tablet; Take 1 tablet (20 mg) by mouth daily.  - metFORMIN (GLUCOPHAGE XR) 500 MG 24 hr tablet; Take 2 tablets (1,000 mg) by mouth 2 times daily (with meals).  - **Hemoglobin A1c FUTURE 3mo; Future  - Albumin Random Urine Quantitative with Creat Ratio; Future  - **Basic metabolic panel FUTURE 2mo; Future  - **TSH with free T4 reflex FUTURE 2mo; Future  - **Hemoglobin A1c FUTURE 3mo  - Albumin Random Urine Quantitative with Creat Ratio  - **Basic metabolic panel FUTURE 2mo  - **TSH with free T4 reflex FUTURE 2mo    Morbid obesity (H)  Contributes to diabetes and cholesterol    Hypertriglyceridemia  On statin    Screen for colon cancer  Pt agreeable to colonoscopy  - Colonoscopy Screening  Referral; Future    Patient has been advised of split billing requirements and indicates understanding: Yes        Nicotine/Tobacco Cessation  He reports that he has been smoking cigarettes. He has a 6 pack-year smoking history. He has been exposed to tobacco smoke. He has never used smokeless tobacco.  Nicotine/Tobacco Cessation Plan  Information offered: Patient not interested at this time      BMI  Estimated body mass index is 40.57 kg/m  as calculated from the following:    Height as of this encounter: 1.702 m (5' 7\").    Weight as of this encounter: 117.5 kg (259 lb).   Weight management plan: Discussed healthy diet and exercise guidelines    Counseling  Appropriate preventive services were addressed with this patient via screening, questionnaire, or discussion as appropriate for fall prevention, nutrition, physical activity, " Tobacco-use cessation, social engagement, weight loss and cognition.  Checklist reviewing preventive services available has been given to the patient.  Reviewed patient's diet, addressing concerns and/or questions.   He is at risk for lack of exercise and has been provided with information to increase physical activity for the benefit of his well-being.           Cortney Dominguez is a 49 year old, presenting for the following:  Physical        9/19/2024     7:02 AM   Additional Questions   Roomed by Shelby Ryan MA   Accompanied by Self        Health Care Directive  Patient does not have a Health Care Directive or Living Will: Discussed advance care planning with patient; however, patient declined at this time.    HPI    Pt with diabetes, does not want additional meds  Is not losing weight despite exercising daily  Discussed options such as ozempic vs jardiance  On statin and aspirin  Smoking 1/2 ppd,  trying to quit            9/19/2024   General Health   How would you rate your overall physical health? Good   Feel stress (tense, anxious, or unable to sleep) Not at all            9/19/2024   Nutrition   Three or more servings of calcium each day? Yes   Diet: Carbohydrate counting   How many servings of fruit and vegetables per day? (!) 2-3   How many sweetened beverages each day? 0-1            9/19/2024   Exercise   Days per week of moderate/strenous exercise 2 days      (!) EXERCISE CONCERN      9/19/2024   Social Factors   Frequency of gathering with friends or relatives Once a week   Worry food won't last until get money to buy more No   Food not last or not have enough money for food? No   Do you have housing? (Housing is defined as stable permanent housing and does not include staying ouside in a car, in a tent, in an abandoned building, in an overnight shelter, or couch-surfing.) Yes   Are you worried about losing your housing? No   Lack of transportation? No   Unable to get utilities (heat,electricity)? No     "        9/19/2024   Fall Risk   Fallen 2 or more times in the past year? No   Trouble with walking or balance? No             9/19/2024   Dental   Dentist two times every year? Yes            9/19/2024   TB Screening   Were you born outside of the [place]? Yes            Today's PHQ-2 Score:       9/19/2024     7:04 AM   PHQ-2 ( 1999 [employer])   Q1: Little interest or pleasure in doing things 0   Q2: Feeling down, depressed or hopeless 0   PHQ-2 Score 0   Q1: Little interest or pleasure in doing things Not at all   Q2: Feeling down, depressed or hopeless Not at all   PHQ-2 Score 0           9/19/2024   Substance Use   Alcohol more than 3/day or more than 7/wk No   Do you use any other substances recreationally? No        Social History     Tobacco Use    Smoking status: Every Day     Current packs/day: 0.75     Average packs/day: 0.8 packs/day for 8.0 years (6.0 ttl pk-yrs)     Types: Cigarettes     Passive exposure: Current    Smokeless tobacco: Never    Tobacco comments:     quit 2010   Vaping Use    Vaping status: Never Used   Substance Use Topics    Alcohol use: No    Drug use: No           9/19/2024   STI Screening   New sexual partner(s) since last STI/HIV test? No      ASCVD Risk   The 10-year ASCVD risk score (Ben BOBBY, et al., 2019) is: 15.5%    Values used to calculate the score:      Age: 49 years      Sex: Male      Is Non- : No      Diabetic: Yes      Tobacco smoker: Yes      Systolic Blood Pressure: 118 mmHg      Is BP treated: No      HDL Cholesterol: 33 mg/dL      Total Cholesterol: 165 mg/dL           Reviewed and updated as needed this visit by Provider                          Review of Systems  Constitutional, HEENT, cardiovascular, pulmonary, gi and gu systems are negative, except as otherwise noted.     Objective    Exam  /72   Pulse 88   Temp 98  F (36.7  C) (Tympanic)   Resp 16   Ht 1.702 m (5' 7\")   Wt 117.5 kg (259 lb)   SpO2 97%   BMI 40.57 kg/m   " "  Estimated body mass index is 40.57 kg/m  as calculated from the following:    Height as of this encounter: 1.702 m (5' 7\").    Weight as of this encounter: 117.5 kg (259 lb).    Physical Exam  GENERAL: alert and no distress  EYES: Eyes grossly normal to inspection, PERRL and conjunctivae and sclerae normal  HENT: ear canals and TM's normal, nose and mouth without ulcers or lesions  NECK: no adenopathy, no asymmetry, masses, or scars  RESP: lungs clear to auscultation - no rales, rhonchi or wheezes  CV: regular rate and rhythm, normal S1 S2, no S3 or S4, no murmur, click or rub, no peripheral edema  ABDOMEN: soft, nontender, no hepatosplenomegaly, no masses and bowel sounds normal  MS: no gross musculoskeletal defects noted, no edema  SKIN: no suspicious lesions or rashes  NEURO: Normal strength and tone, mentation intact and speech normal  PSYCH: mentation appears normal, affect normal/bright        Signed Electronically by: Krupa Zayas MD    "

## 2024-10-09 ENCOUNTER — TELEPHONE (OUTPATIENT)
Dept: GASTROENTEROLOGY | Facility: CLINIC | Age: 49
End: 2024-10-09
Payer: COMMERCIAL

## 2024-10-09 DIAGNOSIS — Z12.11 SCREEN FOR COLON CANCER: Primary | ICD-10-CM

## 2024-10-09 NOTE — TELEPHONE ENCOUNTER
"Endoscopy Scheduling Screen    Have you had any respiratory illness or flu-like symptoms in the last 10 days?  No    What is your communication preference for Instructions and/or Bowel Prep?   MyChart    What insurance is in the chart?  Other:  [facility]    Ordering/Referring Provider:   CLAUDIA ULLOA        (If ordering provider performs procedure, schedule with ordering provider unless otherwise instructed. )    BMI: Estimated body mass index is 40.57 kg/m  as calculated from the following:    Height as of 9/19/24: 1.702 m (5' 7\").    Weight as of 9/19/24: 117.5 kg (259 lb).     Sedation Ordered  moderate sedation.   If patient BMI > 50 do not schedule in [facility].    [facility] patient [facility] > 45 [facility]    Are you taking methadone or Suboxone?  NO, No RN review required.    Have you been diagnosed and are being treated for severe PTSD or severe anxiety?  NO, No RN review required.    Are you taking any prescription medications for pain 3 or more times per week?   NO, No RN review required.    Do you have a history of malignant hyperthermia?  No    (Females) Are you currently pregnant?   No     Have you been diagnosed or told you have pulmonary hypertension?   No    Do you have an LVAD?  No    Have you been told you have moderate to severe sleep apnea?  No.    Have you been told you have COPD, asthma, or any other lung disease?  No    Do you have any heart conditions?  No     Have you ever had or are you waiting for an organ transplant?  No. Continue scheduling, no site restrictions.    Have you had a stroke or transient ischemic attack (TIA aka \"mini stroke\" in the last 6 months?   No    Have you been diagnosed with or been told you have cirrhosis of the liver?   No.    Are you currently on dialysis?   No    Do you need assistance transferring?   No    BMI: Estimated body mass index is 40.57 kg/m  as calculated from the following:    Height as of 9/19/24: 1.702 m (5' 7\").    Weight as of 9/19/24: 117.5 kg " (259 lb).     Is patients BMI > 40 and scheduling location [facility]?  No    Do you take an injectable or oral medication for weight loss or diabetes (excluding insulin)?  Yes, hold time can be up to 7 days. Please consult with you prescribing provider to discuss endoscopy recommendations. (Please schedule at least 7 days out.) metformin    Do you take the medication Naltrexone?  No    Do you take blood thinners?  No       Prep   Are you currently on dialysis or do you have chronic kidney disease?  No    Do you have a diagnosis of diabetes?  Yes (Golytely Prep)    Do you have a diagnosis of cystic fibrosis (CF)?  No    On a regular basis do you go 3 -5 days between bowel movements?  No    BMI > 40?  Yes (Extended Prep)    Preferred Pharmacy:        [facility] [address] [facility], [facility] [address]  [address] [address] [address]  Phone: [phone] Fax: [phone]          Final Scheduling Details     Procedure scheduled  Colonoscopy    Surgeon:  Jessica     Date of procedure:  11/27     Pre-OP / PAC:   No - Not required for this site.    Location  [facility] - [facility] - Per order.    Sedation   Moderate Sedation - Per order.      Patient Reminders:   You will receive a call from a Nurse to review instructions and health history.  This assessment must be completed prior to your procedure.  Failure to complete the Nurse assessment may result in the procedure being cancelled.      On the day of your procedure, please designate an adult(s) who can drive you home stay with you for the next 24 hours. The medicines used in the exam will make you sleepy. You will not be able to drive.      You cannot take public transportation, ride share services, or non-medical taxi service without a responsible caregiver.  Medical transport services are allowed with the requirement that a responsible caregiver will receive you at your destination.  We require that drivers and caregivers are confirmed prior to  your procedure.

## 2024-11-05 ENCOUNTER — TELEPHONE (OUTPATIENT)
Dept: FAMILY MEDICINE | Facility: CLINIC | Age: 49
End: 2024-11-05
Payer: COMMERCIAL

## 2024-11-05 NOTE — TELEPHONE ENCOUNTER
Patient Quality Outreach    Patient is due for the following:   Diabetes -  Eye Exam  Colon Cancer Screening    Next Steps:   Schedule eye exam and colon screening    Type of outreach:    Sent [place] message.      Questions for provider review:    None           Lanie Westbrook MA

## 2024-11-06 RX ORDER — BISACODYL 5 MG/1
TABLET, DELAYED RELEASE ORAL
Qty: 4 TABLET | Refills: 0 | Status: SHIPPED | OUTPATIENT
Start: 2024-11-06

## 2024-11-06 NOTE — TELEPHONE ENCOUNTER
Extended Golytely Bowel Prep  recommended due to BMI > 40.  Instructions were sent via [email or web address]. Bowel prep was sent 11/6/2024 to      [place] - [address] [facility], [address]

## 2024-11-15 ENCOUNTER — TELEPHONE (OUTPATIENT)
Dept: GASTROENTEROLOGY | Facility: CLINIC | Age: 49
End: 2024-11-15
Payer: COMMERCIAL

## 2024-11-15 NOTE — TELEPHONE ENCOUNTER
Pre assessment completed for upcoming procedure.      Procedure details:    Patient scheduled for Colonoscopy on 11.27.24.     Arrival time: 0645. Procedure time 0730    Facility location: [facility]; [address] Floor, [place] [address]. Check in location: 2nd Floor at Surgery desk.    Sedation type: Conscious sedation     Pre op exam needed? No.    Indication for procedure: screening    Designated  policy reviewed. Instructed to have someone stay 6 hours post procedure.       Chart review:     Electronic implanted devices? No    Recent diagnosis of diverticulitis within the last 6 weeks?  No      Medication review:    Diabetic? Yes. Oral diabetic medications: Metformin (glucophage): HOLD day of procedure.    Anticoagulants? No    Weight loss medication/injectable? No.    Other medication HOLDING recommendations:  N/A      Prep for procedure:     Bowel prep recommendation: Extended Golytely. Bowel prep prescription sent to      [facility] [address] [address] [facility], [address]    Due to: BMI > 40.     Prep instructions sent via [employer]     Reviewed procedure prep instructions.     Patient verbalized understanding and had no questions or concerns at this time.        Anitha Jo RN  Endoscopy Procedure Pre Assessment   [phone] option 2

## 2024-11-27 ENCOUNTER — HOSPITAL ENCOUNTER (OUTPATIENT)
Facility: AMBULATORY SURGERY CENTER | Age: 49
Discharge: HOME | End: 2024-11-27
Attending: STUDENT IN AN ORGANIZED HEALTH CARE EDUCATION/TRAINING PROGRAM | Admitting: STUDENT IN AN ORGANIZED HEALTH CARE EDUCATION/TRAINING PROGRAM
Payer: COMMERCIAL

## 2024-11-27 VITALS
HEART RATE: 75 BPM | SYSTOLIC BLOOD PRESSURE: 139 MMHG | TEMPERATURE: 97 F | DIASTOLIC BLOOD PRESSURE: 81 MMHG | OXYGEN SATURATION: 95 % | RESPIRATION RATE: 18 BRPM

## 2024-11-27 LAB
COLONOSCOPY: NORMAL
GLUCOSE BLDC GLUCOMTR-MCNC: 157 MG/DL (ref 70–99)

## 2024-11-27 RX ORDER — DIPHENHYDRAMINE HYDROCHLORIDE 50 MG/ML
25-50 INJECTION INTRAMUSCULAR; INTRAVENOUS
Status: DISCONTINUED | OUTPATIENT
Start: 2024-11-27 | End: 2024-11-28 | Stop reason: HOSPADM

## 2024-11-27 RX ORDER — FLUMAZENIL 0.1 MG/ML
0.2 INJECTION, SOLUTION INTRAVENOUS
Status: DISCONTINUED | OUTPATIENT
Start: 2024-11-27 | End: 2024-11-28 | Stop reason: HOSPADM

## 2024-11-27 RX ORDER — ATROPINE SULFATE 0.1 MG/ML
1 INJECTION INTRAVENOUS
Status: DISCONTINUED | OUTPATIENT
Start: 2024-11-27 | End: 2024-11-28 | Stop reason: HOSPADM

## 2024-11-27 RX ORDER — NALOXONE HYDROCHLORIDE 0.4 MG/ML
0.4 INJECTION, SOLUTION INTRAMUSCULAR; INTRAVENOUS; SUBCUTANEOUS
Status: DISCONTINUED | OUTPATIENT
Start: 2024-11-27 | End: 2024-11-28 | Stop reason: HOSPADM

## 2024-11-27 RX ORDER — NALOXONE HYDROCHLORIDE 0.4 MG/ML
0.2 INJECTION, SOLUTION INTRAMUSCULAR; INTRAVENOUS; SUBCUTANEOUS
Status: DISCONTINUED | OUTPATIENT
Start: 2024-11-27 | End: 2024-11-28 | Stop reason: HOSPADM

## 2024-11-27 RX ORDER — FENTANYL CITRATE 50 UG/ML
50-100 INJECTION, SOLUTION INTRAMUSCULAR; INTRAVENOUS EVERY 5 MIN PRN
Status: DISCONTINUED | OUTPATIENT
Start: 2024-11-27 | End: 2024-11-28 | Stop reason: HOSPADM

## 2024-11-27 RX ORDER — SIMETHICONE 40MG/0.6ML
133 SUSPENSION, DROPS(FINAL DOSAGE FORM)(ML) ORAL
Status: DISCONTINUED | OUTPATIENT
Start: 2024-11-27 | End: 2024-11-28 | Stop reason: HOSPADM

## 2024-11-27 RX ORDER — EPINEPHRINE 1 MG/ML
0.1 INJECTION, SOLUTION INTRAMUSCULAR; SUBCUTANEOUS
Status: DISCONTINUED | OUTPATIENT
Start: 2024-11-27 | End: 2024-11-28 | Stop reason: HOSPADM

## 2024-11-27 RX ORDER — LIDOCAINE 40 MG/G
CREAM TOPICAL
Status: DISCONTINUED | OUTPATIENT
Start: 2024-11-27 | End: 2024-11-28 | Stop reason: HOSPADM

## 2024-11-27 NOTE — H&P
Pre-Endoscopy History and Physical     Alberto Fuentes MRN# [id number]   YOB: 1975 Age: 49 year old     Date of Procedure: 11/27/24  Primary care provider: Krupa Zayas  Type of Endoscopy: Colonoscopy  Reason for Procedure: Screening  Type of Anesthesia Anticipated: Moderate Sedation    HPI:    Alberto is a 49 year old male who will be undergoing the above procedure.      Prior colonoscopy: none     A history and physical has been performed, notable for none. The patient's medications and allergies have been reviewed. The risks and benefits of the procedure and the sedation options and risks were discussed with the patient.  All questions were answered and informed consent was obtained.      Symptoms:  Rectal bleeding: No  Irregular bowel habits: No  Abnormal weight loss: No  Changes in stool: No    He denies a personal or family history of anesthesia complications or bleeding disorders. Reports family history of CRC (Great uncle)    Allergies   Allergen Reactions    Seasonal Allergies       Allergy to Latex: NO   Allergy to tape: NO   Intolerances: NO     Current Outpatient Medications   Medication Sig Dispense Refill    aspirin 81 MG EC tablet Take 1 tablet (81 mg) by mouth daily      atorvastatin (LIPITOR) 20 MG tablet Take 1 tablet (20 mg) by mouth daily. 90 tablet 3    metFORMIN (GLUCOPHAGE XR) 500 MG 24 hr tablet Take 2 tablets (1,000 mg) by mouth 2 times daily (with meals). 360 tablet 1    bisacodyl (DULCOLAX) 5 MG EC tablet Two days prior to exam take two (2) tablets at 4pm. One day prior to exam take two (2) tablets at 4pm 4 tablet 0    blood glucose (NO BRAND SPECIFIED) test strip Use to test blood sugar 2 times daily or as directed. To accompany: Blood Glucose Monitor Brands: per insurance. 100 strip 6    Continuous Blood Gluc Sensor (FREESTYLE JOEL 3 SENSOR) MISC 1 Box continuous 1 each 0    Continuous Blood Gluc Sensor (FREESTYLE JOEL 3 SENSOR) MISC 1 Box continuous To change out for  14 days 6 each 11    polyethylene glycol (GOLYTELY) 236 g suspension Two days before procedure at 5PM fill first container with water. Mix and drink an 8 oz glass every 15 minutes until HALF of the container is gone. Place the remainder in the refrigerator. One day before procedure at 5PM drink second half of bowel prep. Drink an 8 oz glass every 15 minutes until it is gone. Day of procedure 6 hours before arrival time fill the 2nd container with water. Mix and drink an 8 oz glass every 15 minutes until HALF of the container is gone. Discard the remaining solution. 8000 mL 0    sildenafil (REVATIO) 20 MG tablet TAKE 1-5 TABLETS BY MOUTH 30 MINUTES TO 4 HOURS BEFORE INTERCOURSE (NEVER USE WITH NITROGLYCERIN, TERAZOSIN, OR DOXAZOSIN) 30 tablet 2    thin (NO BRAND SPECIFIED) lancets Use with lanceting device. To accompany: Blood Glucose Monitor Brands: per insurance. 100 each 6     Current Facility-Administered Medications   Medication Dose Route Frequency Provider Last Rate Last Admin    atropine injection 1 mg  1 mg Intravenous Once PRN Krista Lopez MD        benzocaine 20% (HURRICAINE/TOPEX) 20 % spray 0.5 mL  1 spray Mouth/Throat Once PRN Krista Lopez MD        diphenhydrAMINE (BENADRYL) injection 25-50 mg  25-50 mg Intravenous Once PRN Krista Lopez MD        EPINEPHrine (Anaphylaxis) (ADRENALIN) injection (vial) 0.1 mg  0.1 mg Submucosal Once PRN Krista Lopez MD        fentaNYL (PF) (SUBLIMAZE) injection  mcg   mcg Intravenous Q5 Min PRKrista Andersen MD        flumazenil (ROMAZICON) injection 0.2 mg  0.2 mg Intravenous q1 min prKrista Andersen MD        glucagon injection 0.5 mg  0.5 mg Intravenous Once PRN Krista Lopez MD        midazolam (VERSED) injection 0.5-2 mg  0.5-2 mg Intravenous Q4 Min PRKrista Andersen MD        naloxone (NARCAN) injection 0.2 mg  0.2 mg Intravenous Q2 Min PRN Krista Lopez MD        Or    naloxone  (NARCAN) injection 0.4 mg  0.4 mg Intravenous Q2 Min PRN Krista Lopez MD        Or    naloxone (NARCAN) injection 0.2 mg  0.2 mg Intramuscular Q2 Min PRN Krista Lopez MD        Or    naloxone (NARCAN) injection 0.4 mg  0.4 mg Intramuscular Q2 Min PRN Krista Lopez MD        simethicone (MYLICON) suspension 133 mg  133 mg Oral Once PRN Krista Lopez MD        sodium chloride (PF) 0.9% PF flush 3 mL  3 mL Intravenous q1 min prn Krista Lopez MD        sodium chloride 0.9% BOLUS 500 mL  500 mL Intravenous Once PRN Krista Lopez MD           Patient Active Problem List   Diagnosis    CARDIOVASCULAR SCREENING; LDL GOAL LESS THAN 160    Pharyngitis    Obesity    Overactive bladder    Hypertriglyceridemia    Incisional hernia    Class 2 severe obesity due to excess calories with serious comorbidity in adult (H)    Diabetes mellitus, type 2 (H)        Past Medical History:   Diagnosis Date    Diabetes mellitus, type 2 (H) 9/20/2023    Overactive bladder         Past Surgical History:   Procedure Laterality Date    APPENDECTOMY  10/9/2008       Social History     Tobacco Use    Smoking status: Every Day     Current packs/day: 0.75     Average packs/day: 0.8 packs/day for 8.0 years (6.0 ttl pk-yrs)     Types: Cigarettes     Passive exposure: Current    Smokeless tobacco: Never    Tobacco comments:     quit 2010   Substance Use Topics    Alcohol use: No       Family History   Problem Relation Age of Onset    Gastrointestinal Disease Mother         crohns    Diabetes Mother     Alzheimer Disease Maternal Grandfather     Heart Disease Paternal Grandmother     Gastrointestinal Disease Brother         crohns    Diabetes Father     Lipids Father     Diabetes Maternal Grandmother     Hypertension Maternal Grandmother     Lipids Maternal Grandmother     Coronary Artery Disease Maternal Grandmother     Hyperlipidemia Maternal Grandmother        REVIEW OF SYSTEMS:     5 point ROS  "negative except as noted above in HPI, including Gen., Resp., CV, GI &  system review.      PHYSICAL EXAM:   /80   Pulse 78   Temp 97  F (36.1  C) (Temporal)   Resp 16   SpO2 98%  Estimated body mass index is 40.57 kg/m  as calculated from the following:    Height as of 9/19/24: 1.702 m (5' 7\").    Weight as of 9/19/24: 117.5 kg (259 lb).   All Vitals have been reviewed.    GENERAL APPEARANCE: healthy and alert  MENTAL STATUS: alert  AIRWAY EXAM: Mallampatti Class II (visualization of the soft palate, fauces, and uvula)  RESP: lungs clear to auscultation - no rales, rhonchi or wheezes  CV: regular rates and rhythm      IMPRESSION   ASA Class 2 - Mild systemic disease        PLAN:     Plan for colonoscopy. We discussed the risks, benefits and alternatives and the patient wished to proceed.    The above has been forwarded to the consulting provider.      FLORY VAUGHN MD  [facility]  Phone: [phone]  Fax: [phone]  November 27, 2024    "

## 2024-12-01 ENCOUNTER — MYC REFILL (OUTPATIENT)
Dept: FAMILY MEDICINE | Facility: CLINIC | Age: 49
End: 2024-12-01
Payer: COMMERCIAL

## 2024-12-01 DIAGNOSIS — N52.9 ERECTILE DYSFUNCTION, UNSPECIFIED ERECTILE DYSFUNCTION TYPE: ICD-10-CM

## 2024-12-01 DIAGNOSIS — E11.9 TYPE 2 DIABETES MELLITUS WITHOUT COMPLICATION, WITHOUT LONG-TERM CURRENT USE OF INSULIN (H): ICD-10-CM

## 2024-12-01 RX ORDER — METFORMIN HYDROCHLORIDE 500 MG/1
1000 TABLET, EXTENDED RELEASE ORAL 2 TIMES DAILY WITH MEALS
Qty: 360 TABLET | Refills: 1 | OUTPATIENT
Start: 2024-12-01

## 2024-12-01 RX ORDER — ATORVASTATIN CALCIUM 20 MG/1
20 TABLET, FILM COATED ORAL DAILY
Qty: 90 TABLET | Refills: 3 | OUTPATIENT
Start: 2024-12-01

## 2024-12-02 LAB
PATH REPORT.COMMENTS IMP SPEC: NORMAL
PATH REPORT.COMMENTS IMP SPEC: NORMAL
PATH REPORT.FINAL DX SPEC: NORMAL
PATH REPORT.GROSS SPEC: NORMAL
PATH REPORT.MICROSCOPIC SPEC OTHER STN: NORMAL
PATH REPORT.RELEVANT HX SPEC: NORMAL
PHOTO IMAGE: NORMAL

## 2024-12-02 RX ORDER — SILDENAFIL CITRATE 20 MG/1
TABLET ORAL
Qty: 30 TABLET | Refills: 2 | Status: SHIPPED | OUTPATIENT
Start: 2024-12-02

## 2024-12-18 ENCOUNTER — PATIENT OUTREACH (OUTPATIENT)
Dept: GASTROENTEROLOGY | Facility: CLINIC | Age: 49
End: 2024-12-18
Payer: COMMERCIAL

## 2024-12-18 PROBLEM — D12.6 ADENOMATOUS COLON POLYP: Status: ACTIVE | Noted: 2024-12-18

## 2025-01-05 ENCOUNTER — HEALTH MAINTENANCE LETTER (OUTPATIENT)
Age: 50
End: 2025-01-05

## 2025-02-12 ENCOUNTER — PATIENT OUTREACH (OUTPATIENT)
Dept: FAMILY MEDICINE | Facility: CLINIC | Age: 50
End: 2025-02-12
Payer: COMMERCIAL

## 2025-02-12 NOTE — TELEPHONE ENCOUNTER
Patient Quality Outreach    Patient is due for the following:   Diabetes -  A1C    Action(s) Taken:   No follow up needed at this time.    Type of outreach:    Chart review performed, no outreach needed.    Questions for provider review:    None           Lanie Westbrook MA

## 2025-03-19 ENCOUNTER — DOCUMENTATION ONLY (OUTPATIENT)
Dept: LAB | Facility: CLINIC | Age: 50
End: 2025-03-19
Payer: COMMERCIAL

## 2025-03-19 DIAGNOSIS — E11.9 TYPE 2 DIABETES MELLITUS WITHOUT COMPLICATION, WITHOUT LONG-TERM CURRENT USE OF INSULIN (H): Primary | ICD-10-CM

## 2025-03-19 NOTE — PROGRESS NOTES
Alberto Fuentes has an upcoming lab appointment:    Future Appointments   Date Time Provider Department Center   3/29/2025 11:15 AM AN LAB [facility]         There is no order available. Please review and place either future orders or HMPO (Review of Health Maintenance Protocol Orders), as appropriate.    Health Maintenance Due   Topic    ANNUAL REVIEW OF HM ORDERS     A1C      Anita Mahan

## 2025-03-29 ENCOUNTER — LAB (OUTPATIENT)
Dept: LAB | Facility: CLINIC | Age: 50
End: 2025-03-29
Payer: COMMERCIAL

## 2025-03-29 DIAGNOSIS — E11.9 TYPE 2 DIABETES MELLITUS WITHOUT COMPLICATION, WITHOUT LONG-TERM CURRENT USE OF INSULIN (H): ICD-10-CM

## 2025-03-29 LAB
ANION GAP SERPL CALCULATED.3IONS-SCNC: 14 MMOL/L (ref 7–15)
BUN SERPL-MCNC: 16.4 MG/DL (ref 6–20)
CALCIUM SERPL-MCNC: 9.2 MG/DL (ref 8.8–10.4)
CHLORIDE SERPL-SCNC: 97 MMOL/L (ref 98–107)
CREAT SERPL-MCNC: 0.82 MG/DL (ref 0.67–1.17)
EGFRCR SERPLBLD CKD-EPI 2021: >90 ML/MIN/1.73M2
EST. AVERAGE GLUCOSE BLD GHB EST-MCNC: 278 MG/DL
GLUCOSE SERPL-MCNC: 278 MG/DL (ref 70–99)
HBA1C MFR BLD: 11.3 % (ref 0–5.6)
HCO3 SERPL-SCNC: 24 MMOL/L (ref 22–29)
POTASSIUM SERPL-SCNC: 3.8 MMOL/L (ref 3.4–5.3)
SODIUM SERPL-SCNC: 135 MMOL/L (ref 135–145)

## 2025-03-29 PROCEDURE — 83036 HEMOGLOBIN GLYCOSYLATED A1C: CPT

## 2025-03-29 PROCEDURE — 36415 COLL VENOUS BLD VENIPUNCTURE: CPT

## 2025-03-29 PROCEDURE — 80048 BASIC METABOLIC PNL TOTAL CA: CPT

## 2025-04-21 ENCOUNTER — OFFICE VISIT (OUTPATIENT)
Dept: FAMILY MEDICINE | Facility: CLINIC | Age: 50
End: 2025-04-21
Payer: COMMERCIAL

## 2025-04-21 ENCOUNTER — MYC MEDICAL ADVICE (OUTPATIENT)
Dept: FAMILY MEDICINE | Facility: CLINIC | Age: 50
End: 2025-04-21

## 2025-04-21 VITALS
TEMPERATURE: 98.4 F | HEIGHT: 68 IN | HEART RATE: 83 BPM | WEIGHT: 245.2 LBS | OXYGEN SATURATION: 97 % | DIASTOLIC BLOOD PRESSURE: 75 MMHG | BODY MASS INDEX: 37.16 KG/M2 | SYSTOLIC BLOOD PRESSURE: 117 MMHG | RESPIRATION RATE: 20 BRPM

## 2025-04-21 DIAGNOSIS — E11.9 TYPE 2 DIABETES MELLITUS WITHOUT COMPLICATION, WITHOUT LONG-TERM CURRENT USE OF INSULIN (H): Primary | ICD-10-CM

## 2025-04-21 DIAGNOSIS — E78.1 HYPERTRIGLYCERIDEMIA: ICD-10-CM

## 2025-04-21 PROBLEM — E66.01 CLASS 2 SEVERE OBESITY DUE TO EXCESS CALORIES WITH SERIOUS COMORBIDITY IN ADULT (H): Status: RESOLVED | Noted: 2023-09-19 | Resolved: 2025-04-21

## 2025-04-21 PROBLEM — E66.812 CLASS 2 SEVERE OBESITY DUE TO EXCESS CALORIES WITH SERIOUS COMORBIDITY IN ADULT (H): Status: RESOLVED | Noted: 2023-09-19 | Resolved: 2025-04-21

## 2025-04-21 PROCEDURE — G2211 COMPLEX E/M VISIT ADD ON: HCPCS | Performed by: FAMILY MEDICINE

## 2025-04-21 PROCEDURE — 1126F AMNT PAIN NOTED NONE PRSNT: CPT | Performed by: FAMILY MEDICINE

## 2025-04-21 PROCEDURE — 99214 OFFICE O/P EST MOD 30 MIN: CPT | Performed by: FAMILY MEDICINE

## 2025-04-21 PROCEDURE — 3078F DIAST BP <80 MM HG: CPT | Performed by: FAMILY MEDICINE

## 2025-04-21 PROCEDURE — 3074F SYST BP LT 130 MM HG: CPT | Performed by: FAMILY MEDICINE

## 2025-04-21 RX ORDER — METFORMIN HYDROCHLORIDE 500 MG/1
1000 TABLET, EXTENDED RELEASE ORAL 2 TIMES DAILY WITH MEALS
Qty: 360 TABLET | Refills: 1 | Status: SHIPPED | OUTPATIENT
Start: 2025-04-21

## 2025-04-21 RX ORDER — FEXOFENADINE HCL 180 MG/1
180 TABLET ORAL DAILY
COMMUNITY

## 2025-04-21 ASSESSMENT — PAIN SCALES - GENERAL: PAINLEVEL_OUTOF10: NO PAIN (0)

## 2025-04-21 NOTE — PROGRESS NOTES
"  Assessment & Plan     Type 2 diabetes mellitus without complication, without long-term current use of insulin (H)  Not well controlled  Had bad flu in January which carried into February  Will start GLP-1 to help with weight loss as well if covered by insurance  Rechec A1c in 3 months with lab visit  - metFORMIN (GLUCOPHAGE XR) 500 MG 24 hr tablet; Take 2 tablets (1,000 mg) by mouth 2 times daily (with meals).  - Albumin Random Urine Quantitative with Creat Ratio; Future  - Lipid panel reflex to direct LDL Fasting; Future  - tirzepatide (MOUNJARO) 2.5 MG/0.5ML SOAJ auto-injector pen; Inject 0.5 mLs (2.5 mg) subcutaneously once a week.  - **Hemoglobin A1c FUTURE 3mo; Future    Hypertriglyceridemia  Should improve as A1c improves    BMI 37.0-37.9, adult  Contributes to diabetes,   Start GLP-1      The longitudinal plan of care for the diagnosis(es)/condition(s) as documented were addressed during this visit. Due to the added complexity in care, I will continue to support Alberto in the subsequent management and with ongoing continuity of care.      BMI  Estimated body mass index is 37.28 kg/m  as calculated from the following:    Height as of this encounter: 1.727 m (5' 8\").    Weight as of this encounter: 111.2 kg (245 lb 3.2 oz).   Weight management plan: Discussed healthy diet and exercise guidelines          Subjective   Alberto is a 49 year old, presenting for the following health issues:  Diabetes      4/21/2025     6:56 AM   Additional Questions   Roomed by Cuco     Via the Health Maintenance questionnaire, the patient has reported the following services have been completed -Eye Exam: [facility] 2025-07-21, this information has been sent to the abstraction team.  History of Present Illness       Diabetes:   He presents for follow up of diabetes.  He is checking home blood glucose two times daily.   He checks blood glucose before meals and after meals.  Blood glucose is sometimes over 200 and never under 70. He " "is aware of hypoglycemia symptoms including blurred vision.   He is concerned about blood sugar frequently over 200.    He is not experiencing numbness or burning in feet, excessive thirst, blurry vision, weight changes or redness, sores or blisters on feet. The patient has had a diabetic eye exam in the last 12 months. Eye exam performed on 072125. [facility] of [facility].        He eats 2-3 servings of fruits and vegetables daily.He consumes 0 sweetened beverage(s) daily.He exercises with enough effort to increase his heart rate 30 to 60 minutes per day.  He exercises with enough effort to increase his heart rate 5 days per week.   He is taking medications regularly.        Not sure why BSs are up but had bad flu this winter  Is eating healthy and exercising regularly  Is on metformin  Will add GLP-1 that is covered  May need to add jardiance      Review of Systems  Constitutional, HEENT, cardiovascular, pulmonary, gi and gu systems are negative, except as otherwise noted.      Objective    /75   Pulse 83   Temp 98.4  F (36.9  C) (Oral)   Resp 20   Ht 1.727 m (5' 8\")   Wt 111.2 kg (245 lb 3.2 oz)   SpO2 97%   BMI 37.28 kg/m    Body mass index is 37.28 kg/m .  Physical Exam   GENERAL: alert and no distress  NECK: no adenopathy, no asymmetry, masses, or scars  RESP: lungs clear to auscultation - no rales, rhonchi or wheezes  CV: regular rate and rhythm, normal S1 S2, no S3 or S4, no murmur, click or rub, no peripheral edema   MS: no gross musculoskeletal defects noted, no edema  Diabetic foot exam: normal DP and PT pulses, no trophic changes or ulcerative lesions, and normal sensory exam    No results found for this or any previous visit (from the past 24 hours).        Signed Electronically by: Krupa Zayas MD    "

## 2025-05-10 ENCOUNTER — MYC REFILL (OUTPATIENT)
Dept: FAMILY MEDICINE | Facility: CLINIC | Age: 50
End: 2025-05-10
Payer: COMMERCIAL

## 2025-05-10 DIAGNOSIS — E11.9 TYPE 2 DIABETES MELLITUS WITHOUT COMPLICATION, WITHOUT LONG-TERM CURRENT USE OF INSULIN (H): ICD-10-CM

## 2025-05-13 ENCOUNTER — TELEPHONE (OUTPATIENT)
Dept: FAMILY MEDICINE | Facility: CLINIC | Age: 50
End: 2025-05-13
Payer: COMMERCIAL

## 2025-05-13 NOTE — TELEPHONE ENCOUNTER
Patient Quality Outreach    Patient is due for the following:   Diabetes -  A1C and Eye Exam    Action(s) Taken:   Schedule a office visit for eye exam    Type of outreach:    Sent [employer] message.    Questions for provider review:    None         Lanie Westbrook MA  Chart routed to None.

## 2025-06-10 ENCOUNTER — MYC REFILL (OUTPATIENT)
Dept: FAMILY MEDICINE | Facility: CLINIC | Age: 50
End: 2025-06-10
Payer: COMMERCIAL

## 2025-06-10 DIAGNOSIS — J30.2 SEASONAL ALLERGIC RHINITIS, UNSPECIFIED TRIGGER: Primary | ICD-10-CM

## 2025-06-10 DIAGNOSIS — E11.9 TYPE 2 DIABETES MELLITUS WITHOUT COMPLICATION, WITHOUT LONG-TERM CURRENT USE OF INSULIN (H): ICD-10-CM

## 2025-06-10 RX ORDER — FEXOFENADINE HCL 180 MG/1
180 TABLET ORAL DAILY
Qty: 90 TABLET | Refills: 3 | Status: SHIPPED | OUTPATIENT
Start: 2025-06-10

## 2025-07-07 ENCOUNTER — MYC REFILL (OUTPATIENT)
Dept: FAMILY MEDICINE | Facility: CLINIC | Age: 50
End: 2025-07-07
Payer: COMMERCIAL

## 2025-07-07 DIAGNOSIS — E11.9 TYPE 2 DIABETES MELLITUS WITHOUT COMPLICATION, WITHOUT LONG-TERM CURRENT USE OF INSULIN (H): ICD-10-CM

## 2025-07-08 RX ORDER — ATORVASTATIN CALCIUM 20 MG/1
20 TABLET, FILM COATED ORAL DAILY
Qty: 90 TABLET | Refills: 3 | OUTPATIENT
Start: 2025-07-08

## 2025-07-13 ENCOUNTER — HEALTH MAINTENANCE LETTER (OUTPATIENT)
Age: 50
End: 2025-07-13

## 2025-08-02 ENCOUNTER — MYC REFILL (OUTPATIENT)
Dept: FAMILY MEDICINE | Facility: CLINIC | Age: 50
End: 2025-08-02
Payer: COMMERCIAL

## 2025-08-02 DIAGNOSIS — E11.9 TYPE 2 DIABETES MELLITUS WITHOUT COMPLICATION, WITHOUT LONG-TERM CURRENT USE OF INSULIN (H): ICD-10-CM

## 2025-08-04 ENCOUNTER — MYC REFILL (OUTPATIENT)
Dept: FAMILY MEDICINE | Facility: CLINIC | Age: 50
End: 2025-08-04
Payer: COMMERCIAL

## 2025-08-04 DIAGNOSIS — E11.9 TYPE 2 DIABETES MELLITUS WITHOUT COMPLICATION, WITHOUT LONG-TERM CURRENT USE OF INSULIN (H): ICD-10-CM

## 2025-08-09 ENCOUNTER — LAB (OUTPATIENT)
Dept: LAB | Facility: CLINIC | Age: 50
End: 2025-08-09
Payer: COMMERCIAL

## 2025-08-09 DIAGNOSIS — E11.9 TYPE 2 DIABETES MELLITUS WITHOUT COMPLICATION, WITHOUT LONG-TERM CURRENT USE OF INSULIN (H): ICD-10-CM

## 2025-08-09 LAB
CHOLEST SERPL-MCNC: 113 MG/DL
CREAT UR-MCNC: 55.3 MG/DL
EST. AVERAGE GLUCOSE BLD GHB EST-MCNC: 134 MG/DL
FASTING STATUS PATIENT QL REPORTED: YES
HBA1C MFR BLD: 6.3 % (ref 0–5.6)
HDLC SERPL-MCNC: 36 MG/DL
LDLC SERPL CALC-MCNC: 64 MG/DL
MICROALBUMIN UR-MCNC: <12 MG/L
MICROALBUMIN/CREAT UR: NORMAL MG/G{CREAT}
NONHDLC SERPL-MCNC: 77 MG/DL
TRIGL SERPL-MCNC: 63 MG/DL

## 2025-08-09 PROCEDURE — 82043 UR ALBUMIN QUANTITATIVE: CPT

## 2025-08-09 PROCEDURE — 82570 ASSAY OF URINE CREATININE: CPT

## 2025-08-09 PROCEDURE — 36415 COLL VENOUS BLD VENIPUNCTURE: CPT

## 2025-08-09 PROCEDURE — 83036 HEMOGLOBIN GLYCOSYLATED A1C: CPT

## 2025-08-09 PROCEDURE — 80061 LIPID PANEL: CPT

## 2025-08-20 ENCOUNTER — PATIENT OUTREACH (OUTPATIENT)
Dept: CARE COORDINATION | Facility: CLINIC | Age: 50
End: 2025-08-20
Payer: COMMERCIAL

## 2025-09-01 ENCOUNTER — MYC REFILL (OUTPATIENT)
Dept: FAMILY MEDICINE | Facility: CLINIC | Age: 50
End: 2025-09-01
Payer: COMMERCIAL

## 2025-09-01 DIAGNOSIS — E11.9 TYPE 2 DIABETES MELLITUS WITHOUT COMPLICATION, WITHOUT LONG-TERM CURRENT USE OF INSULIN (H): ICD-10-CM

## 2025-09-03 ENCOUNTER — PATIENT OUTREACH (OUTPATIENT)
Dept: CARE COORDINATION | Facility: CLINIC | Age: 50
End: 2025-09-03
Payer: COMMERCIAL

## (undated) RX ORDER — SIMETHICONE 40MG/0.6ML
SUSPENSION, DROPS(FINAL DOSAGE FORM)(ML) ORAL
Status: DISPENSED
Start: 2024-11-27

## (undated) RX ORDER — FENTANYL CITRATE 50 UG/ML
INJECTION, SOLUTION INTRAMUSCULAR; INTRAVENOUS
Status: DISPENSED
Start: 2024-11-27